# Patient Record
Sex: FEMALE | Race: WHITE | NOT HISPANIC OR LATINO | Employment: FULL TIME | URBAN - METROPOLITAN AREA
[De-identification: names, ages, dates, MRNs, and addresses within clinical notes are randomized per-mention and may not be internally consistent; named-entity substitution may affect disease eponyms.]

---

## 2018-02-20 ENCOUNTER — APPOINTMENT (OUTPATIENT)
Dept: PREADMISSION TESTING | Facility: HOSPITAL | Age: 63
End: 2018-02-20

## 2018-02-20 VITALS
OXYGEN SATURATION: 97 % | HEART RATE: 103 BPM | TEMPERATURE: 98.3 F | HEIGHT: 65 IN | DIASTOLIC BLOOD PRESSURE: 81 MMHG | BODY MASS INDEX: 40.65 KG/M2 | RESPIRATION RATE: 18 BRPM | SYSTOLIC BLOOD PRESSURE: 132 MMHG | WEIGHT: 244 LBS

## 2018-02-20 LAB
ANION GAP SERPL CALCULATED.3IONS-SCNC: 9.3 MMOL/L
BASOPHILS # BLD AUTO: 0.03 10*3/MM3 (ref 0–0.2)
BASOPHILS NFR BLD AUTO: 0.3 % (ref 0–1.5)
BUN BLD-MCNC: 18 MG/DL (ref 8–23)
BUN/CREAT SERPL: 15.1 (ref 7–25)
CALCIUM SPEC-SCNC: 9.3 MG/DL (ref 8.6–10.5)
CHLORIDE SERPL-SCNC: 94 MMOL/L (ref 98–107)
CO2 SERPL-SCNC: 33.7 MMOL/L (ref 22–29)
CREAT BLD-MCNC: 1.19 MG/DL (ref 0.57–1)
DEPRECATED RDW RBC AUTO: 44.1 FL (ref 37–54)
EOSINOPHIL # BLD AUTO: 0.05 10*3/MM3 (ref 0–0.7)
EOSINOPHIL NFR BLD AUTO: 0.5 % (ref 0.3–6.2)
ERYTHROCYTE [DISTWIDTH] IN BLOOD BY AUTOMATED COUNT: 13.9 % (ref 11.7–13)
GFR SERPL CREATININE-BSD FRML MDRD: 46 ML/MIN/1.73
GLUCOSE BLD-MCNC: 187 MG/DL (ref 65–99)
HCT VFR BLD AUTO: 40.7 % (ref 35.6–45.5)
HGB BLD-MCNC: 13.2 G/DL (ref 11.9–15.5)
IMM GRANULOCYTES # BLD: 0.03 10*3/MM3 (ref 0–0.03)
IMM GRANULOCYTES NFR BLD: 0.3 % (ref 0–0.5)
LYMPHOCYTES # BLD AUTO: 2.4 10*3/MM3 (ref 0.9–4.8)
LYMPHOCYTES NFR BLD AUTO: 23.1 % (ref 19.6–45.3)
MCH RBC QN AUTO: 28.3 PG (ref 26.9–32)
MCHC RBC AUTO-ENTMCNC: 32.4 G/DL (ref 32.4–36.3)
MCV RBC AUTO: 87.3 FL (ref 80.5–98.2)
MONOCYTES # BLD AUTO: 0.64 10*3/MM3 (ref 0.2–1.2)
MONOCYTES NFR BLD AUTO: 6.2 % (ref 5–12)
NEUTROPHILS # BLD AUTO: 7.22 10*3/MM3 (ref 1.9–8.1)
NEUTROPHILS NFR BLD AUTO: 69.6 % (ref 42.7–76)
PLATELET # BLD AUTO: 410 10*3/MM3 (ref 140–500)
PMV BLD AUTO: 10.7 FL (ref 6–12)
POTASSIUM BLD-SCNC: 3.5 MMOL/L (ref 3.5–5.2)
RBC # BLD AUTO: 4.66 10*6/MM3 (ref 3.9–5.2)
SODIUM BLD-SCNC: 137 MMOL/L (ref 136–145)
WBC NRBC COR # BLD: 10.37 10*3/MM3 (ref 4.5–10.7)

## 2018-02-20 PROCEDURE — 93010 ELECTROCARDIOGRAM REPORT: CPT | Performed by: INTERNAL MEDICINE

## 2018-02-20 PROCEDURE — 85025 COMPLETE CBC W/AUTO DIFF WBC: CPT | Performed by: OBSTETRICS & GYNECOLOGY

## 2018-02-20 PROCEDURE — 80048 BASIC METABOLIC PNL TOTAL CA: CPT | Performed by: OBSTETRICS & GYNECOLOGY

## 2018-02-20 PROCEDURE — 36415 COLL VENOUS BLD VENIPUNCTURE: CPT

## 2018-02-20 PROCEDURE — 93005 ELECTROCARDIOGRAM TRACING: CPT

## 2018-02-20 RX ORDER — HYDROCHLOROTHIAZIDE 50 MG/1
50 TABLET ORAL DAILY
COMMUNITY
End: 2021-03-03 | Stop reason: ALTCHOICE

## 2018-02-20 RX ORDER — OMEPRAZOLE 20 MG/1
20 CAPSULE, DELAYED RELEASE ORAL DAILY
COMMUNITY

## 2018-02-20 RX ORDER — ALBUTEROL SULFATE 90 UG/1
2 AEROSOL, METERED RESPIRATORY (INHALATION) EVERY 4 HOURS PRN
COMMUNITY
End: 2021-09-30

## 2018-02-20 RX ORDER — GUAIFENESIN 600 MG/1
600 TABLET, EXTENDED RELEASE ORAL 2 TIMES DAILY
COMMUNITY
End: 2018-05-15

## 2018-02-20 NOTE — DISCHARGE INSTRUCTIONS
Take the following medications the morning of surgery with a small sip of water:    DULERA, ALBUTEROL, OMEPRAZOLE    General Instructions:  • Do not eat solid food after midnight the night before surgery.  • You may drink clear liquids day of surgery but must stop at least one hour before your hospital arrival time.  • It is beneficial for you to have a clear drink that contains carbohydrates the day of surgery.  We suggest a 12 to 20 ounce bottle of Gatorade or Powerade for non-diabetic patients or a 12 to 20 ounce bottle of G2 or Powerade Zero for diabetic patients. (Pediatric patients, are not advised to drink a 12 to 20 ounce carbohydrate drink)    Clear liquids are liquids you can see through.  Nothing red in color.     Plain water                               Sports drinks  Sodas                                   Gelatin (Jell-O)  Fruit juices without pulp such as white grape juice and apple juice  Popsicles that contain no fruit or yogurt  Tea or coffee (no cream or milk added)  Gatorade / Powerade  G2 / Powerade Zero    • Infants may have breast milk up to four hours before surgery.  • Infants drinking formula may drink formula up to six hours before surgery.   • Patients who avoid smoking, chewing tobacco and alcohol for 4 weeks prior to surgery have a reduced risk of post-operative complications.  Quit smoking as many days before surgery as you can.  • Do not smoke, use chewing tobacco or drink alcohol the day of surgery.   • If applicable bring your C-PAP/ BI-PAP machine.  • Bring any papers given to you in the doctor’s office.  • Wear clean comfortable clothes and socks.  • Do not wear contact lenses or make-up.  Bring a case for your glasses.   • Bring crutches or walker if applicable.  • Remove all piercings.  Leave jewelry and any other valuables at home.  • Hair extensions with metal clips must be removed prior to surgery.  • The Pre-Admission Testing nurse will instruct you to bring medications if  unable to obtain an accurate list in Pre-Admission Testing.        If you were given a blood bank ID arm band remember to bring it with you the day of surgery.    Preventing a Surgical Site Infection:  • For 2 to 3 days before surgery, avoid shaving with a razor because the razor can irritate skin and make it easier to develop an infection.  • The night prior to surgery sleep in a clean bed with clean clothing.  Do not allow pets to sleep with you.  • Shower on the morning of surgery using a fresh bar of anti-bacterial soap (such as Dial) and clean washcloth.  Dry with a clean towel and dress in clean clothing.  • Ask your surgeon if you will be receiving antibiotics prior to surgery.  • Make sure you, your family, and all healthcare providers clean their hands with soap and water or an alcohol based hand  before caring for you or your wound.    Day of surgery:  Upon arrival, a Pre-op nurse and Anesthesiologist will review your health history, obtain vital signs, and answer questions you may have.  The only belongings needed at this time will be your home medications and if applicable your C-PAP/BI-PAP machine.  If you are staying overnight your family can leave the rest of your belongings in the car and bring them to your room later.  A Pre-op nurse will start an IV and you may receive medication in preparation for surgery, including something to help you relax.  Your family will be able to see you in the Pre-op area.  While you are in surgery your family should notify the waiting room  if they leave the waiting room area and provide a contact phone number.    Please be aware that surgery does come with discomfort.  We want to make every effort to control your discomfort so please discuss any uncontrolled symptoms with your nurse.   Your doctor will most likely have prescribed pain medications.      If you are going home after surgery you will receive individualized written care instructions  before being discharged.  A responsible adult must drive you to and from the hospital on the day of your surgery and stay with you for 24 hours.    If you are staying overnight following surgery, you will be transported to your hospital room following the recovery period.  [facility] has all private rooms.    If you have any questions please call Pre-Admission Testing at [phone].  Deductibles and co-payments are collected on the day of service. Please be prepared to pay the required co-pay, deductible or deposit on the day of service as defined by your plan.

## 2018-02-21 ENCOUNTER — HOSPITAL ENCOUNTER (OUTPATIENT)
Facility: HOSPITAL | Age: 63
Setting detail: HOSPITAL OUTPATIENT SURGERY
Discharge: HOME | End: 2018-02-21
Attending: OBSTETRICS & GYNECOLOGY | Admitting: OBSTETRICS & GYNECOLOGY

## 2018-02-21 ENCOUNTER — ANESTHESIA EVENT (OUTPATIENT)
Dept: PERIOP | Facility: HOSPITAL | Age: 63
End: 2018-02-21

## 2018-02-21 ENCOUNTER — ANESTHESIA (OUTPATIENT)
Dept: PERIOP | Facility: HOSPITAL | Age: 63
End: 2018-02-21

## 2018-02-21 VITALS
HEART RATE: 60 BPM | RESPIRATION RATE: 16 BRPM | OXYGEN SATURATION: 96 % | SYSTOLIC BLOOD PRESSURE: 136 MMHG | TEMPERATURE: 97.6 F | DIASTOLIC BLOOD PRESSURE: 81 MMHG

## 2018-02-21 DIAGNOSIS — N95.0 POSTMENOPAUSAL BLEEDING: ICD-10-CM

## 2018-02-21 PROCEDURE — 88305 TISSUE EXAM BY PATHOLOGIST: CPT | Performed by: OBSTETRICS & GYNECOLOGY

## 2018-02-21 PROCEDURE — 25010000002 PROPOFOL 10 MG/ML EMULSION: Performed by: NURSE ANESTHETIST, CERTIFIED REGISTERED

## 2018-02-21 PROCEDURE — 25010000002 FENTANYL CITRATE (PF) 100 MCG/2ML SOLUTION: Performed by: NURSE ANESTHETIST, CERTIFIED REGISTERED

## 2018-02-21 PROCEDURE — 25010000002 MIDAZOLAM PER 1 MG: Performed by: ANESTHESIOLOGY

## 2018-02-21 PROCEDURE — 25010000002 MIDAZOLAM PER 1 MG: Performed by: NURSE ANESTHETIST, CERTIFIED REGISTERED

## 2018-02-21 PROCEDURE — 25010000002 ONDANSETRON PER 1 MG: Performed by: NURSE ANESTHETIST, CERTIFIED REGISTERED

## 2018-02-21 RX ORDER — HYDRALAZINE HYDROCHLORIDE 20 MG/ML
5 INJECTION INTRAMUSCULAR; INTRAVENOUS
Status: DISCONTINUED | OUTPATIENT
Start: 2018-02-21 | End: 2018-02-21 | Stop reason: HOSPADM

## 2018-02-21 RX ORDER — MIDAZOLAM HYDROCHLORIDE 1 MG/ML
INJECTION INTRAMUSCULAR; INTRAVENOUS AS NEEDED
Status: DISCONTINUED | OUTPATIENT
Start: 2018-02-21 | End: 2018-02-21 | Stop reason: SURG

## 2018-02-21 RX ORDER — LIDOCAINE HYDROCHLORIDE 10 MG/ML
0.5 INJECTION, SOLUTION EPIDURAL; INFILTRATION; INTRACAUDAL; PERINEURAL ONCE AS NEEDED
Status: DISCONTINUED | OUTPATIENT
Start: 2018-02-21 | End: 2018-02-21

## 2018-02-21 RX ORDER — OXYCODONE HYDROCHLORIDE AND ACETAMINOPHEN 5; 325 MG/1; MG/1
1 TABLET ORAL ONCE AS NEEDED
Status: DISCONTINUED | OUTPATIENT
Start: 2018-02-21 | End: 2018-02-21 | Stop reason: HOSPADM

## 2018-02-21 RX ORDER — ACETAMINOPHEN 650 MG/1
650 SUPPOSITORY RECTAL ONCE AS NEEDED
Status: DISCONTINUED | OUTPATIENT
Start: 2018-02-21 | End: 2018-02-21 | Stop reason: HOSPADM

## 2018-02-21 RX ORDER — NALBUPHINE HCL 10 MG/ML
10 AMPUL (ML) INJECTION EVERY 4 HOURS PRN
Status: DISCONTINUED | OUTPATIENT
Start: 2018-02-21 | End: 2018-02-21 | Stop reason: HOSPADM

## 2018-02-21 RX ORDER — OXYCODONE HYDROCHLORIDE AND ACETAMINOPHEN 5; 325 MG/1; MG/1
1 TABLET ORAL EVERY 4 HOURS PRN
Qty: 10 TABLET | Refills: 0 | Status: SHIPPED | OUTPATIENT
Start: 2018-02-21 | End: 2018-05-15

## 2018-02-21 RX ORDER — IBUPROFEN 600 MG/1
600 TABLET ORAL EVERY 6 HOURS PRN
Qty: 30 TABLET | Refills: 0 | Status: SHIPPED | OUTPATIENT
Start: 2018-02-21 | End: 2018-05-15

## 2018-02-21 RX ORDER — MIDAZOLAM HYDROCHLORIDE 1 MG/ML
1 INJECTION INTRAMUSCULAR; INTRAVENOUS
Status: DISCONTINUED | OUTPATIENT
Start: 2018-02-21 | End: 2018-02-21

## 2018-02-21 RX ORDER — ACETAMINOPHEN 325 MG/1
650 TABLET ORAL ONCE
Status: DISCONTINUED | OUTPATIENT
Start: 2018-02-21 | End: 2018-02-21

## 2018-02-21 RX ORDER — FENTANYL CITRATE 50 UG/ML
INJECTION, SOLUTION INTRAMUSCULAR; INTRAVENOUS AS NEEDED
Status: DISCONTINUED | OUTPATIENT
Start: 2018-02-21 | End: 2018-02-21 | Stop reason: SURG

## 2018-02-21 RX ORDER — FAMOTIDINE 10 MG/ML
20 INJECTION, SOLUTION INTRAVENOUS ONCE
Status: COMPLETED | OUTPATIENT
Start: 2018-02-21 | End: 2018-02-21

## 2018-02-21 RX ORDER — SODIUM CHLORIDE, SODIUM LACTATE, POTASSIUM CHLORIDE, CALCIUM CHLORIDE 600; 310; 30; 20 MG/100ML; MG/100ML; MG/100ML; MG/100ML
9 INJECTION, SOLUTION INTRAVENOUS CONTINUOUS
Status: DISCONTINUED | OUTPATIENT
Start: 2018-02-21 | End: 2018-02-21

## 2018-02-21 RX ORDER — LIDOCAINE HYDROCHLORIDE 10 MG/ML
0.5 INJECTION, SOLUTION EPIDURAL; INFILTRATION; INTRACAUDAL; PERINEURAL ONCE AS NEEDED
Status: COMPLETED | OUTPATIENT
Start: 2018-02-21 | End: 2018-02-21

## 2018-02-21 RX ORDER — PROPOFOL 10 MG/ML
VIAL (ML) INTRAVENOUS CONTINUOUS PRN
Status: DISCONTINUED | OUTPATIENT
Start: 2018-02-21 | End: 2018-02-21 | Stop reason: SURG

## 2018-02-21 RX ORDER — NALOXONE HCL 0.4 MG/ML
0.4 VIAL (ML) INJECTION AS NEEDED
Status: DISCONTINUED | OUTPATIENT
Start: 2018-02-21 | End: 2018-02-21 | Stop reason: HOSPADM

## 2018-02-21 RX ORDER — MIDAZOLAM HYDROCHLORIDE 1 MG/ML
2 INJECTION INTRAMUSCULAR; INTRAVENOUS
Status: DISCONTINUED | OUTPATIENT
Start: 2018-02-21 | End: 2018-02-21 | Stop reason: HOSPADM

## 2018-02-21 RX ORDER — DIPHENHYDRAMINE HYDROCHLORIDE 50 MG/ML
12.5 INJECTION INTRAMUSCULAR; INTRAVENOUS
Status: DISCONTINUED | OUTPATIENT
Start: 2018-02-21 | End: 2018-02-21 | Stop reason: HOSPADM

## 2018-02-21 RX ORDER — PROMETHAZINE HYDROCHLORIDE 25 MG/ML
6.25 INJECTION, SOLUTION INTRAMUSCULAR; INTRAVENOUS ONCE AS NEEDED
Status: DISCONTINUED | OUTPATIENT
Start: 2018-02-21 | End: 2018-02-21 | Stop reason: HOSPADM

## 2018-02-21 RX ORDER — MAGNESIUM HYDROXIDE 1200 MG/15ML
LIQUID ORAL AS NEEDED
Status: DISCONTINUED | OUTPATIENT
Start: 2018-02-21 | End: 2018-02-21 | Stop reason: HOSPADM

## 2018-02-21 RX ORDER — ONDANSETRON 2 MG/ML
INJECTION INTRAMUSCULAR; INTRAVENOUS AS NEEDED
Status: DISCONTINUED | OUTPATIENT
Start: 2018-02-21 | End: 2018-02-21 | Stop reason: SURG

## 2018-02-21 RX ORDER — ACETAMINOPHEN 325 MG/1
650 TABLET ORAL ONCE AS NEEDED
Status: DISCONTINUED | OUTPATIENT
Start: 2018-02-21 | End: 2018-02-21 | Stop reason: HOSPADM

## 2018-02-21 RX ORDER — PROMETHAZINE HYDROCHLORIDE 25 MG/1
25 SUPPOSITORY RECTAL ONCE AS NEEDED
Status: DISCONTINUED | OUTPATIENT
Start: 2018-02-21 | End: 2018-02-21 | Stop reason: HOSPADM

## 2018-02-21 RX ORDER — ACETAMINOPHEN 325 MG/1
650 TABLET ORAL ONCE
Status: COMPLETED | OUTPATIENT
Start: 2018-02-21 | End: 2018-02-21

## 2018-02-21 RX ORDER — MIDAZOLAM HYDROCHLORIDE 1 MG/ML
2 INJECTION INTRAMUSCULAR; INTRAVENOUS
Status: DISCONTINUED | OUTPATIENT
Start: 2018-02-21 | End: 2018-02-21

## 2018-02-21 RX ORDER — MIDAZOLAM HYDROCHLORIDE 1 MG/ML
1 INJECTION INTRAMUSCULAR; INTRAVENOUS
Status: DISCONTINUED | OUTPATIENT
Start: 2018-02-21 | End: 2018-02-21 | Stop reason: HOSPADM

## 2018-02-21 RX ORDER — FENTANYL CITRATE 50 UG/ML
50 INJECTION, SOLUTION INTRAMUSCULAR; INTRAVENOUS
Status: DISCONTINUED | OUTPATIENT
Start: 2018-02-21 | End: 2018-02-21 | Stop reason: HOSPADM

## 2018-02-21 RX ORDER — NALBUPHINE HCL 10 MG/ML
2 AMPUL (ML) INJECTION EVERY 4 HOURS PRN
Status: DISCONTINUED | OUTPATIENT
Start: 2018-02-21 | End: 2018-02-21 | Stop reason: HOSPADM

## 2018-02-21 RX ORDER — SODIUM CHLORIDE, SODIUM LACTATE, POTASSIUM CHLORIDE, CALCIUM CHLORIDE 600; 310; 30; 20 MG/100ML; MG/100ML; MG/100ML; MG/100ML
9 INJECTION, SOLUTION INTRAVENOUS CONTINUOUS
Status: DISCONTINUED | OUTPATIENT
Start: 2018-02-21 | End: 2018-02-21 | Stop reason: HOSPADM

## 2018-02-21 RX ORDER — PROMETHAZINE HYDROCHLORIDE 25 MG/1
25 TABLET ORAL ONCE AS NEEDED
Status: DISCONTINUED | OUTPATIENT
Start: 2018-02-21 | End: 2018-02-21 | Stop reason: HOSPADM

## 2018-02-21 RX ORDER — SODIUM CHLORIDE 0.9 % (FLUSH) 0.9 %
1-10 SYRINGE (ML) INJECTION AS NEEDED
Status: DISCONTINUED | OUTPATIENT
Start: 2018-02-21 | End: 2018-02-21

## 2018-02-21 RX ORDER — SODIUM CHLORIDE 0.9 % (FLUSH) 0.9 %
1-10 SYRINGE (ML) INJECTION AS NEEDED
Status: DISCONTINUED | OUTPATIENT
Start: 2018-02-21 | End: 2018-02-21 | Stop reason: HOSPADM

## 2018-02-21 RX ORDER — LIDOCAINE HYDROCHLORIDE 10 MG/ML
INJECTION, SOLUTION INFILTRATION; PERINEURAL AS NEEDED
Status: DISCONTINUED | OUTPATIENT
Start: 2018-02-21 | End: 2018-02-21 | Stop reason: HOSPADM

## 2018-02-21 RX ADMIN — MIDAZOLAM 2 MG: 1 INJECTION INTRAMUSCULAR; INTRAVENOUS at 12:09

## 2018-02-21 RX ADMIN — LIDOCAINE HYDROCHLORIDE 0.5 ML: 10 INJECTION, SOLUTION EPIDURAL; INFILTRATION; INTRACAUDAL; PERINEURAL at 10:40

## 2018-02-21 RX ADMIN — ACETAMINOPHEN 650 MG: 325 TABLET, FILM COATED ORAL at 10:43

## 2018-02-21 RX ADMIN — FENTANYL CITRATE 50 MCG: 50 INJECTION INTRAMUSCULAR; INTRAVENOUS at 12:27

## 2018-02-21 RX ADMIN — ONDANSETRON 4 MG: 2 INJECTION INTRAMUSCULAR; INTRAVENOUS at 12:27

## 2018-02-21 RX ADMIN — FAMOTIDINE 20 MG: 10 INJECTION, SOLUTION INTRAVENOUS at 10:44

## 2018-02-21 RX ADMIN — FENTANYL CITRATE 50 MCG: 50 INJECTION INTRAMUSCULAR; INTRAVENOUS at 12:09

## 2018-02-21 RX ADMIN — SODIUM CHLORIDE, POTASSIUM CHLORIDE, SODIUM LACTATE AND CALCIUM CHLORIDE 9 ML/HR: 600; 310; 30; 20 INJECTION, SOLUTION INTRAVENOUS at 10:40

## 2018-02-21 RX ADMIN — PROPOFOL 100 MCG/KG/MIN: 10 INJECTION, EMULSION INTRAVENOUS at 12:09

## 2018-02-21 RX ADMIN — MIDAZOLAM 2 MG: 1 INJECTION INTRAMUSCULAR; INTRAVENOUS at 10:44

## 2018-02-21 NOTE — OP NOTE
Patient Name: Virginia Wood  :  1955  MRN:  [id number]      Date of Service:  18      Surgeon: Aleja Maciel MD       Pre-operative diagnosis(es): Postmenopausal bleeding     Post-operative diagnosis(es): Postmenopausal bleeding   Procedure(s): Procedure(s):  DILATATION AND CURETTAGE HYSTEROSCOPY          Anesthesia: Type: MAC          Operative findings: Hysteroscopic survey revealed a normal-appearing atrophic endometrium with no masses appreciated     Specimens removed: Endometrial currettings           EBL: 10cc     Indication for surgery:  Postmenopausal bleeding, thickened endometrium    Procedure:   Patient was taken operating room where IV sedation was obtained without difficulty.  She was then placed in the dorsal lithotomy position in stirrups and prepped and draped in the normal sterile fashion.  A speculum was placed within the vagina and the cervix was grasped with a tenaculum.  A paracervical block was then performed injecting 1% lidocaine at the 4 and 7:00 positions of the cervical vaginal junction.  10 cc was injected at each site.   The cervix was then gently dilated in order to allow passage of the operative hysteroscope and the above findings were noted.   The hysteroscope was removed and several passages with the curette were performed until minimal tissue was noted and a gritty texture was noted within the cavity.  The  Tenaculum and speculum was then removed.  Sponge and needle counts are correct ×2.  She was awakened from anesthesia and taken to face to recovery in stable condition.                                              Aleja Maciel MD  18  1:32 PM

## 2018-02-21 NOTE — ANESTHESIA POSTPROCEDURE EVALUATION
Patient: Virginia Wood    Procedure Summary     Date Anesthesia Start Anesthesia Stop Room / Location    02/21/18 1209 1242  [facility]  /  [facility]       Procedure Diagnosis Surgeon Provider    DILATATION AND CURETTAGE HYSTEROSCOPY WITH POLYPECTOMY AND MYOSURE (N/A Uterus) No diagnosis on file. MD Ambrose Bragg MD          Anesthesia Type: MAC  Last vitals  BP   136/81 (02/21/18 1315)   Temp   36.4 °C (97.6 °F) (02/21/18 1242)   Pulse   60 (02/21/18 1315)   Resp   16 (02/21/18 1315)     SpO2   96 % (02/21/18 1315)     Post Anesthesia Care and Evaluation    Patient location during evaluation: bedside  Patient participation: complete - patient participated  Level of consciousness: awake and alert  Pain management: adequate  Airway patency: patent  Anesthetic complications: No anesthetic complications    Cardiovascular status: acceptable  Respiratory status: acceptable  Hydration status: acceptable    Comments: /81 (BP Location: Right arm, Patient Position: Lying)  Pulse 60  Temp 36.4 °C (97.6 °F) (Oral)   Resp 16  SpO2 96%

## 2018-02-21 NOTE — H&P
Patient Care Team:  No Known Provider as PCP - General    Chief complaint post-menopausal bleeding    Subjective     History of Present Illness Post-menopausal woman with vaginal bleeding and ultrasound showing thickened EMS. EMB shows uterine polyp.    Review of Systems     Past Medical History:   Diagnosis Date   • Asthma    • Congenital abnormality of kidney    • GERD (gastroesophageal reflux disease)    • Sinusitis    • Uterine polyp    • UTI (urinary tract infection)     on augmentin     Past Surgical History:   Procedure Laterality Date   • APPENDECTOMY     • BLADDER SURGERY     • DILATATION AND CURETTAGE     • LUMBAR DISCECTOMY     • NEPHRECTOMY PARTIAL Left    • TUBAL ABDOMINAL LIGATION       Family History   Problem Relation Age of Onset   • Malig Hyperthermia Neg Hx      Social History   Substance Use Topics   • Smoking status: Never Smoker   • Smokeless tobacco: Never Used   • Alcohol use Yes      Comment: OCC     Prescriptions Prior to Admission   Medication Sig Dispense Refill Last Dose   • albuterol (PROVENTIL HFA;VENTOLIN HFA) 108 (90 Base) MCG/ACT inhaler Inhale 2 puffs Every 4 (Four) Hours As Needed for Wheezing.   More than a month at Unknown time   • Amoxicillin-Pot Clavulanate (AUGMENTIN PO) Take  by mouth 2 (Two) Times a Day.   2/20/2018   • guaiFENesin (MUCINEX) 600 MG 12 hr tablet Take 600 mg by mouth 2 (Two) Times a Day.   2/20/2018   • hydrochlorothiazide (HYDRODIURIL) 50 MG tablet Take 50 mg by mouth Daily.   2/20/2018   • mometasone-formoterol (DULERA 100) 100-5 MCG/ACT inhaler Inhale 2 puffs 2 (Two) Times a Day.   More than a month at Unknown time   • omeprazole (priLOSEC) 20 MG capsule Take 20 mg by mouth Daily.   2/21/2018     Allergies:  Nickel    Objective      Vital Signs  Temp:  [97.9 °F (36.6 °C)-98.3 °F (36.8 °C)] 97.9 °F (36.6 °C)  Heart Rate:  [] 78  Resp:  [16-18] 16  BP: (132-144)/(81-85) 144/85    Physical Exam  See clinic note  Results Review:   I reviewed the  patient's new clinical results.      Assessment/Plan   Plan for hysteroscopy/D&C/polypectomy. All questions answered, risks reviewed, consent signed.  Active Problems:    * No active hospital problems. *      Assessment & Plan    I discussed the patients findings and my recommendations with patient    Aleja Maciel MD  02/21/18  10:15 AM

## 2018-02-21 NOTE — PLAN OF CARE
Problem: Perioperative Period (Adult)  Goal: Signs and Symptoms of Listed Potential Problems Will be Absent or Manageable (Perioperative Period)  Outcome: Ongoing (interventions implemented as appropriate)   02/21/18 0958   Perioperative Period   Problems Assessed (Perioperative Period) all

## 2018-02-21 NOTE — ANESTHESIA PREPROCEDURE EVALUATION
Anesthesia Evaluation     no history of anesthetic complications:  NPO Solid Status: > 8 hours             Airway   Mallampati: I  TM distance: >3 FB  Neck ROM: full  no difficulty expected  Dental - normal exam     Pulmonary - normal exam   (+) asthma,   (-) not a smoker  Cardiovascular   Exercise tolerance: good (4-7 METS)    Rhythm: regular    (-) murmur, carotid bruits      Neuro/Psych  GI/Hepatic/Renal/Endo    (+) obesity,  GERD,     Musculoskeletal     Abdominal    Substance History      OB/GYN          Other                      Anesthesia Plan    ASA 3     MAC   (D/W pt. MAC and possible awareness intra op.  Pt understands MAC and GA are not the same and the possibility of GA being required for failed MAC  \    Listed as GA on schedule but will plan MAC.  Discussed both w pt.)  intravenous induction

## 2018-02-21 NOTE — PLAN OF CARE
Problem: Patient Care Overview (Adult)  Goal: Plan of Care Review  Outcome: Ongoing (interventions implemented as appropriate)   02/21/18 0959   Coping/Psychosocial Response Interventions   Plan Of Care Reviewed With patient     Goal: Discharge Needs Assessment  Outcome: Ongoing (interventions implemented as appropriate)   02/21/18 0959   Discharge Needs Assessment   Concerns To Be Addressed denies needs/concerns at this time

## 2018-02-22 LAB
CYTO UR: NORMAL
LAB AP CASE REPORT: NORMAL
Lab: NORMAL
PATH REPORT.FINAL DX SPEC: NORMAL
PATH REPORT.GROSS SPEC: NORMAL

## 2018-05-15 ENCOUNTER — APPOINTMENT (OUTPATIENT)
Dept: GENERAL RADIOLOGY | Facility: HOSPITAL | Age: 63
End: 2018-05-15

## 2018-05-15 ENCOUNTER — HOSPITAL ENCOUNTER (EMERGENCY)
Facility: HOSPITAL | Age: 63
Discharge: HOME | End: 2018-05-15
Attending: EMERGENCY MEDICINE | Admitting: EMERGENCY MEDICINE

## 2018-05-15 VITALS
WEIGHT: 235 LBS | TEMPERATURE: 97.9 F | DIASTOLIC BLOOD PRESSURE: 70 MMHG | SYSTOLIC BLOOD PRESSURE: 133 MMHG | RESPIRATION RATE: 15 BRPM | BODY MASS INDEX: 39.15 KG/M2 | OXYGEN SATURATION: 97 % | HEART RATE: 69 BPM | HEIGHT: 65 IN

## 2018-05-15 DIAGNOSIS — E87.6 HYPOKALEMIA: ICD-10-CM

## 2018-05-15 DIAGNOSIS — R07.89 ATYPICAL CHEST PAIN: Primary | ICD-10-CM

## 2018-05-15 LAB
ALBUMIN SERPL-MCNC: 3.6 G/DL (ref 3.5–5.2)
ALBUMIN/GLOB SERPL: 1.2 G/DL
ALP SERPL-CCNC: 84 U/L (ref 40–129)
ALT SERPL W P-5'-P-CCNC: 16 U/L (ref 5–33)
ANION GAP SERPL CALCULATED.3IONS-SCNC: 10.8 MMOL/L
APTT PPP: 30.3 SECONDS (ref 24.3–38.1)
AST SERPL-CCNC: 15 U/L (ref 5–32)
BASOPHILS # BLD AUTO: 0.05 10*3/MM3 (ref 0–0.2)
BASOPHILS NFR BLD AUTO: 0.7 % (ref 0–2)
BILIRUB SERPL-MCNC: 0.3 MG/DL (ref 0.2–1.2)
BUN BLD-MCNC: 16 MG/DL (ref 8–23)
BUN/CREAT SERPL: 16 (ref 7–25)
CALCIUM SPEC-SCNC: 9.3 MG/DL (ref 8.8–10.5)
CHLORIDE SERPL-SCNC: 99 MMOL/L (ref 98–107)
CO2 SERPL-SCNC: 30.2 MMOL/L (ref 22–29)
CREAT BLD-MCNC: 1 MG/DL (ref 0.57–1)
D DIMER PPP FEU-MCNC: 0.33 MCGFEU/ML (ref 0–0.46)
DEPRECATED RDW RBC AUTO: 40.3 FL (ref 37–54)
EOSINOPHIL # BLD AUTO: 0.09 10*3/MM3 (ref 0.1–0.3)
EOSINOPHIL NFR BLD AUTO: 1.2 % (ref 0–4)
ERYTHROCYTE [DISTWIDTH] IN BLOOD BY AUTOMATED COUNT: 13.1 % (ref 11.5–14.5)
GFR SERPL CREATININE-BSD FRML MDRD: 56 ML/MIN/1.73
GLOBULIN UR ELPH-MCNC: 2.9 GM/DL
GLUCOSE BLD-MCNC: 119 MG/DL (ref 65–99)
HCT VFR BLD AUTO: 38.2 % (ref 37–47)
HGB BLD-MCNC: 12.6 G/DL (ref 12–16)
IMM GRANULOCYTES # BLD: 0.01 10*3/MM3 (ref 0–0.03)
IMM GRANULOCYTES NFR BLD: 0.1 % (ref 0–0.5)
INR PPP: 1.09 (ref 0.9–1.1)
LYMPHOCYTES # BLD AUTO: 1.65 10*3/MM3 (ref 0.6–4.8)
LYMPHOCYTES NFR BLD AUTO: 21.6 % (ref 20–45)
MCH RBC QN AUTO: 28 PG (ref 27–31)
MCHC RBC AUTO-ENTMCNC: 33 G/DL (ref 31–37)
MCV RBC AUTO: 84.9 FL (ref 81–99)
MONOCYTES # BLD AUTO: 0.8 10*3/MM3 (ref 0–1)
MONOCYTES NFR BLD AUTO: 10.5 % (ref 3–8)
NEUTROPHILS # BLD AUTO: 5.03 10*3/MM3 (ref 1.5–8.3)
NEUTROPHILS NFR BLD AUTO: 65.9 % (ref 45–70)
NRBC BLD MANUAL-RTO: 0 /100 WBC (ref 0–0)
PLATELET # BLD AUTO: 323 10*3/MM3 (ref 140–500)
PMV BLD AUTO: 10 FL (ref 7.4–10.4)
POTASSIUM BLD-SCNC: 2.8 MMOL/L (ref 3.5–5.2)
PROT SERPL-MCNC: 6.5 G/DL (ref 6–8.5)
PROTHROMBIN TIME: 14.1 SECONDS (ref 12.1–15)
RBC # BLD AUTO: 4.5 10*6/MM3 (ref 4.2–5.4)
SODIUM BLD-SCNC: 140 MMOL/L (ref 136–145)
TROPONIN T SERPL-MCNC: <0.01 NG/ML (ref 0–0.03)
WBC NRBC COR # BLD: 7.63 10*3/MM3 (ref 4.8–10.8)

## 2018-05-15 PROCEDURE — 71045 X-RAY EXAM CHEST 1 VIEW: CPT

## 2018-05-15 PROCEDURE — 93010 ELECTROCARDIOGRAM REPORT: CPT | Performed by: INTERNAL MEDICINE

## 2018-05-15 PROCEDURE — 93005 ELECTROCARDIOGRAM TRACING: CPT

## 2018-05-15 PROCEDURE — 85379 FIBRIN DEGRADATION QUANT: CPT | Performed by: PHYSICIAN ASSISTANT

## 2018-05-15 PROCEDURE — 84484 ASSAY OF TROPONIN QUANT: CPT | Performed by: PHYSICIAN ASSISTANT

## 2018-05-15 PROCEDURE — 85730 THROMBOPLASTIN TIME PARTIAL: CPT | Performed by: PHYSICIAN ASSISTANT

## 2018-05-15 PROCEDURE — 99284 EMERGENCY DEPT VISIT MOD MDM: CPT

## 2018-05-15 PROCEDURE — 85610 PROTHROMBIN TIME: CPT | Performed by: PHYSICIAN ASSISTANT

## 2018-05-15 PROCEDURE — 80053 COMPREHEN METABOLIC PANEL: CPT | Performed by: PHYSICIAN ASSISTANT

## 2018-05-15 PROCEDURE — 99285 EMERGENCY DEPT VISIT HI MDM: CPT | Performed by: PHYSICIAN ASSISTANT

## 2018-05-15 PROCEDURE — 93005 ELECTROCARDIOGRAM TRACING: CPT | Performed by: EMERGENCY MEDICINE

## 2018-05-15 PROCEDURE — 85025 COMPLETE CBC W/AUTO DIFF WBC: CPT | Performed by: PHYSICIAN ASSISTANT

## 2018-05-15 RX ORDER — POTASSIUM CHLORIDE 20 MEQ/1
60 TABLET, EXTENDED RELEASE ORAL DAILY
Status: DISCONTINUED | OUTPATIENT
Start: 2018-05-15 | End: 2018-05-15 | Stop reason: HOSPADM

## 2018-05-15 RX ORDER — METHOCARBAMOL 750 MG/1
750 TABLET, FILM COATED ORAL 3 TIMES DAILY PRN
Qty: 20 TABLET | Refills: 0 | Status: SHIPPED | OUTPATIENT
Start: 2018-05-15 | End: 2021-03-03 | Stop reason: ALTCHOICE

## 2018-05-15 RX ORDER — MELOXICAM 7.5 MG/1
7.5 TABLET ORAL DAILY PRN
Qty: 15 TABLET | Refills: 0 | Status: SHIPPED | OUTPATIENT
Start: 2018-05-15 | End: 2021-03-03 | Stop reason: ALTCHOICE

## 2018-05-15 RX ORDER — SODIUM CHLORIDE 0.9 % (FLUSH) 0.9 %
10 SYRINGE (ML) INJECTION AS NEEDED
Status: DISCONTINUED | OUTPATIENT
Start: 2018-05-15 | End: 2018-05-15 | Stop reason: HOSPADM

## 2018-05-15 RX ORDER — IBUPROFEN 400 MG/1
800 TABLET ORAL ONCE
Status: COMPLETED | OUTPATIENT
Start: 2018-05-15 | End: 2018-05-15

## 2018-05-15 RX ADMIN — Medication 10 ML: at 12:28

## 2018-05-15 RX ADMIN — IBUPROFEN 800 MG: 400 TABLET ORAL at 14:34

## 2018-05-15 RX ADMIN — POTASSIUM CHLORIDE 60 MEQ: 1500 TABLET, EXTENDED RELEASE ORAL at 14:34

## 2018-05-15 NOTE — ED PROVIDER NOTES
Subjective   History of Present Illness  History of Present Illness    Chief complaint: cp    Location: R upper chest with radiation to back    Quality/Severity:  Sharp, moderate when it occurs.  None currently.    Timing/Duration: 2 days, worsening    Modifying Factors: worse with deep breath and cough and palpation. Nothing makes better.    Associated Symptoms: Chronic cough is unchanged.  Denies hemoptysis.  Denies shortness of breath.  Eyes abdominal pain or nausea/vomiting.  Denies fevers or chills.    Narrative: 63-year-old female, who is a , presents with chest pain that started 2 days ago.  Within the past month she is also taken extended vacations with multiple flights.  She went to an immediate care center earlier today for her chest pain.  She was told to come here for possible pulmonary embolus.  She denies any trauma.  No history of cardiac.    Review of Systems  General: Denies fevers or chills.  Denies any weakness or fatigue.  Denies any weight loss or weight gain.  SKIN: Denies any rashes lesions or ulcers.  Denies color change.  ENT: Denies sore throat or rhinorrhea.  Denies ear pain.    EYES: Denies any blurred vision.  Denies any change in vision.  Denies any photophobia.  Denies any vision loss.  LUNGS: Denies any shortness of breath or wheezing.  Denies any cough.  Denies any hemoptysis.  CARDIAC: + chest pain.  Denies palpitations.  Denies syncope.  Denies any edema  ABD: Denies any abdominal pain.  Denies any nausea or vomiting or diarrhea.  Denies any rectal bleeding.  Denies constipation  : Denies any dysuria, urgency, frequency or hematuria.  Denies discharge.  Denies flank pain.  NEURO: Denies any focal weakness.  Denies headache.  Denies seizures.  Denies changes in speech or difficulty walking.  ENDOCRINE: Denies polydipsia and polyuria  M/S: Denies arthralgias, back pain, myalgias or neck pain  HEME/LYMPH: Negative for adenopathy. Does not bruise/bleed easily.   PSYCH:  Negative for suicidal ideas. Denies anxiety or depression  review was performed in addition to those in the above all other reviews are negative.      Past Medical History:   Diagnosis Date   • Asthma    • Congenital abnormality of kidney    • GERD (gastroesophageal reflux disease)    • Sinusitis    • Uterine polyp    • UTI (urinary tract infection)     on augmentin       Allergies   Allergen Reactions   • Nickel Rash       Past Surgical History:   Procedure Laterality Date   • APPENDECTOMY     • BLADDER SURGERY     • D&C HYSTEROSCOPY N/A 2/21/2018    Procedure: DILATATION AND CURETTAGE HYSTEROSCOPY WITH POLYPECTOMY AND MYOSURE;  Surgeon: Aleja Maciel MD;  [place] [facility] [place] [facility] [place]  Service:    • DILATATION AND CURETTAGE     • LUMBAR DISCECTOMY     • NEPHRECTOMY PARTIAL Left    • TUBAL ABDOMINAL LIGATION         Family History   Problem Relation Age of Onset   • Malig Hyperthermia Neg Hx        Social History     Social History   • Marital status:      Social History Main Topics   • Smoking status: Never Smoker   • Smokeless tobacco: Never Used   • Alcohol use Yes      Comment: OCC   • Drug use: No   • Sexual activity: Defer     Other Topics Concern   • Not on file     Current Facility-Administered Medications:   •  Insert peripheral IV, , , Once **AND** sodium chloride 0.9 % flush 10 mL, 10 mL, Intravenous, PRN, Kathryn Kaminski PA-C, 10 mL at 05/15/18 1228    Current Outpatient Prescriptions:   •  albuterol (PROVENTIL HFA;VENTOLIN HFA) 108 (90 Base) MCG/ACT inhaler, Inhale 2 puffs Every 4 (Four) Hours As Needed for Wheezing., Disp: , Rfl:   •  hydrochlorothiazide (HYDRODIURIL) 50 MG tablet, Take 50 mg by mouth Daily., Disp: , Rfl:   •  ibuprofen (ADVIL,MOTRIN) 600 MG tablet, Take 1 tablet by mouth Every 6 (Six) Hours As Needed for Mild Pain ., Disp: 30 tablet, Rfl: 0  •  omeprazole (priLOSEC) 20 MG capsule, Take 20 mg by mouth Daily., Disp: , Rfl:           Objective   Physical Exam  Vitals:    05/15/18  1217   BP: 166/80   Pulse: 77   Resp:    Temp:    SpO2: 91%   Respirations 15, temp 98    GENERAL: a/o x 4, NAD  SKIN: Warm pink and dry   HEENT:  PERRLA, EOM intact, conjunctiva normal, sclera clear  NECK: supple, no JVD  LUNGS: Clear to auscultation bilaterally without wheezes, rales or rhonchi.  No accessory muscle use and no nasal flaring.  Right upper chest wall tenderness.  Reproduces symptoms.  CARDIAC:  Regular rate and rhythm, S1-S2.  No murmurs, rubs or gallops.  No peripheral edema.  Equal pulses bilaterally.  ABDOMEN: Soft, nontender, nondistended.  No guarding or rebound tenderness.  Normal bowel sounds.  MUSCULOSKELETAL: Moves all extremities well.  No deformity.  NEURO: Cranial nerves II through XII grossly intact.  No gross focal deficits.  Alert.  Normal speech and motor.  PSYCH: Normal mood and affect        Procedures           ED Course  ED Course    EKG         EKG time / Interpretation time: 1110 / 1118  Rhythm/Rate: NSR 96   WI: 132  QRS, axis: 76   QTc 450  ST and T waves: inversion III, aVR, I. Depression   V4,V5, V6 1mm  Interpreted Contemporaneously by me, independently viewed by me and MD.  unchanged compared to prior 2/20/18    Reviewed CXR. Independently viewed by me. Interpreted by radiologist. Discussed with pt.  Xr Chest 1 View    Result Date: 5/15/2018  Narrative: CHEST X-RAY, 5/15/2018  HISTORY: 63-year-old female in the ED complaining of two day history right-sided chest pain.  TECHNIQUE: AP portable upright chest x-ray.  FINDINGS: Heart size and pulmonary vascularity are normal. The lungs are expanded and clear. No visible pulmonary infiltrate or pleural effusion.      Impression: Negative chest.  This report was finalized on 5/15/2018 12:33 PM by Dr. Vj Vargas MD.        Results for orders placed or performed during the hospital encounter of 05/15/18   Comprehensive Metabolic Panel   Result Value Ref Range    Glucose 119 (H) 65 - 99 mg/dL    BUN 16 8 - 23 mg/dL     Creatinine 1.00 0.57 - 1.00 mg/dL    Sodium 140 136 - 145 mmol/L    Potassium 2.8 (L) 3.5 - 5.2 mmol/L    Chloride 99 98 - 107 mmol/L    CO2 30.2 (H) 22.0 - 29.0 mmol/L    Calcium 9.3 8.8 - 10.5 mg/dL    Total Protein 6.5 6.0 - 8.5 g/dL    Albumin 3.60 3.50 - 5.20 g/dL    ALT (SGPT) 16 5 - 33 U/L    AST (SGOT) 15 5 - 32 U/L    Alkaline Phosphatase 84 40 - 129 U/L    Total Bilirubin 0.3 0.2 - 1.2 mg/dL    eGFR Non African Amer 56 (L) >60 mL/min/1.73    Globulin 2.9 gm/dL    A/G Ratio 1.2 g/dL    BUN/Creatinine Ratio 16.0 7.0 - 25.0    Anion Gap 10.8 mmol/L   aPTT   Result Value Ref Range    PTT 30.3 24.3 - 38.1 seconds   Protime-INR   Result Value Ref Range    Protime 14.1 12.1 - 15.0 Seconds    INR 1.09 0.90 - 1.10   Troponin   Result Value Ref Range    Troponin T <0.010 0.000 - 0.030 ng/mL   D-dimer, Quantitative   Result Value Ref Range    D-Dimer, Quantitative 0.33 0.00 - 0.46 MCGFEU/mL   CBC Auto Differential   Result Value Ref Range    WBC 7.63 4.80 - 10.80 10*3/mm3    RBC 4.50 4.20 - 5.40 10*6/mm3    Hemoglobin 12.6 12.0 - 16.0 g/dL    Hematocrit 38.2 37.0 - 47.0 %    MCV 84.9 81.0 - 99.0 fL    MCH 28.0 27.0 - 31.0 pg    MCHC 33.0 31.0 - 37.0 g/dL    RDW 13.1 11.5 - 14.5 %    RDW-SD 40.3 37.0 - 54.0 fl    MPV 10.0 7.4 - 10.4 fL    Platelets 323 140 - 500 10*3/mm3    Neutrophil % 65.9 45.0 - 70.0 %    Lymphocyte % 21.6 20.0 - 45.0 %    Monocyte % 10.5 (H) 3.0 - 8.0 %    Eosinophil % 1.2 0.0 - 4.0 %    Basophil % 0.7 0.0 - 2.0 %    Immature Grans % 0.1 0.0 - 0.5 %    Neutrophils, Absolute 5.03 1.50 - 8.30 10*3/mm3    Lymphocytes, Absolute 1.65 0.60 - 4.80 10*3/mm3    Monocytes, Absolute 0.80 0.00 - 1.00 10*3/mm3    Eosinophils, Absolute 0.09 (L) 0.10 - 0.30 10*3/mm3    Basophils, Absolute 0.05 0.00 - 0.20 10*3/mm3    Immature Grans, Absolute 0.01 0.00 - 0.03 10*3/mm3    nRBC 0.0 0.0 - 0.0 /100 WBC     1300- no cp since arrival. Feels good. No soa. HEART = low risk.  Cp is not typical and does not sound cardiac.  No change in EKG.    Discussed pertinent labs and imaging findings with the patient/family.  Patient/Family voiced understanding of need to follow-up for recheck, further testing as needed.  Return to the emergency Department warnings were given.  D/c with mobic/ robaxin.            ASHISH Mcwilliams differential diagnosis for chest pain includes but is not limited to:  Muscle strain, costochondritis, myositis, pleurisy, rib fracture, intercostal neuritis, herpes zoster, tumor, myocardial infarction, coronary syndrome, unstable angina, angina, aortic dissection, mitral valve prolapse, pericarditis, palpitations, pulmonary embolus, pneumonia, pneumothorax, lung cancer, GERD, esophagitis, esophageal spasm      Final diagnoses:   Atypical chest pain   Hypokalemia       Dictated utilizing Dragon dictation       Kathryn Kaminski PA-C  05/15/18 4131

## 2019-02-11 ENCOUNTER — TRANSCRIBE ORDERS (OUTPATIENT)
Dept: ADMINISTRATIVE | Facility: HOSPITAL | Age: 64
End: 2019-02-11

## 2019-02-11 DIAGNOSIS — Z12.31 VISIT FOR SCREENING MAMMOGRAM: Primary | ICD-10-CM

## 2019-02-11 DIAGNOSIS — Z78.0 POSTMENOPAUSAL: ICD-10-CM

## 2019-02-22 ENCOUNTER — APPOINTMENT (OUTPATIENT)
Dept: BONE DENSITY | Facility: HOSPITAL | Age: 64
End: 2019-02-22

## 2019-02-22 ENCOUNTER — HOSPITAL ENCOUNTER (OUTPATIENT)
Dept: MAMMOGRAPHY | Facility: HOSPITAL | Age: 64
Discharge: HOME | End: 2019-02-22
Admitting: FAMILY MEDICINE

## 2019-02-22 DIAGNOSIS — Z12.31 VISIT FOR SCREENING MAMMOGRAM: ICD-10-CM

## 2019-02-22 DIAGNOSIS — Z78.0 POSTMENOPAUSAL: ICD-10-CM

## 2019-02-22 PROCEDURE — 77080 DXA BONE DENSITY AXIAL: CPT

## 2019-02-22 PROCEDURE — 77063 BREAST TOMOSYNTHESIS BI: CPT

## 2019-02-22 PROCEDURE — 77067 SCR MAMMO BI INCL CAD: CPT

## 2020-06-05 ENCOUNTER — HOSPITAL ENCOUNTER (OUTPATIENT)
Dept: MAMMOGRAPHY | Facility: HOSPITAL | Age: 65
Discharge: HOME | End: 2020-06-05
Admitting: FAMILY MEDICINE

## 2020-06-05 DIAGNOSIS — Z12.39 SCREENING BREAST EXAMINATION: ICD-10-CM

## 2020-06-05 PROCEDURE — 77063 BREAST TOMOSYNTHESIS BI: CPT

## 2020-06-05 PROCEDURE — 77067 SCR MAMMO BI INCL CAD: CPT

## 2021-03-03 ENCOUNTER — OFFICE VISIT (OUTPATIENT)
Dept: ORTHOPEDIC SURGERY | Facility: CLINIC | Age: 66
End: 2021-03-03

## 2021-03-03 VITALS
HEART RATE: 114 BPM | BODY MASS INDEX: 39.15 KG/M2 | DIASTOLIC BLOOD PRESSURE: 108 MMHG | SYSTOLIC BLOOD PRESSURE: 164 MMHG | HEIGHT: 65 IN | WEIGHT: 235 LBS

## 2021-03-03 DIAGNOSIS — M75.51 SUBACROMIAL BURSITIS OF RIGHT SHOULDER JOINT: ICD-10-CM

## 2021-03-03 DIAGNOSIS — M67.911 TENDINOPATHY OF ROTATOR CUFF, RIGHT: Primary | ICD-10-CM

## 2021-03-03 DIAGNOSIS — M19.019 AC JOINT ARTHROPATHY: ICD-10-CM

## 2021-03-03 PROCEDURE — 99203 OFFICE O/P NEW LOW 30 MIN: CPT | Performed by: NURSE PRACTITIONER

## 2021-03-03 RX ORDER — SPIRONOLACTONE AND HYDROCHLOROTHIAZIDE 25; 25 MG/1; MG/1
TABLET ORAL
COMMUNITY
Start: 2021-02-15

## 2021-03-03 RX ORDER — MELOXICAM 15 MG/1
TABLET ORAL
COMMUNITY
Start: 2021-02-23 | End: 2021-03-03

## 2021-03-03 NOTE — PROGRESS NOTES
Subjective:     Patient ID: Virginia Wood is a 65 y.o. female.    Chief Complaint:  Right shoulder injury 12//8/2020  History of Present Illness  Virginia Wood presents to clinic for evaluation of her right shoulder.  She tripped striking the anterolateral aspect of her right shoulder approximately 3 months ago and is continue experiencing pain ever since.  She initially injured the shoulder 3 years ago when she was going down her steps at her home when she caught her foot under her pant leg and she kept holding on by her right shoulder.  She felt a significant pull possibly a pop did not present for evaluation however was sent for physical therapy which did seem to somewhat help regain her strength.  Most recently she was out of town ambulating through customs when she again tripped after she states stubbed her toe on a door jam falling into the wall striking the lateral aspect of the shoulder.  She immediately presented for x-ray imaging at outside facility which she has brought with her available for viewing.  Increased pain noted with forward flexion, attempting to reach back behind her back, reaching out to the side as well.  Increased pain noted at night which she is unable to sleep on the right shoulder secondary to the pain.  She was started on meloxicam and recently discontinued, she has 1 functioning kidney primary care wanted to take her off medication.  Denies any previous MRI, CT.  She is right-hand dominant is a  is experiencing pain on a daily basis when she is driving with the right upper extremity out in front of her.  Does report decreased strength of the right upper extremity.  Denies presence of numbness or tingling right upper extremity.  Rates discomfort 6-7 out of a 10 describes a sharp, aching in nature.  Denies other concerns present this time.    Social History     Occupational History   • Not on file   Tobacco Use   • Smoking status: Never Smoker   • Smokeless tobacco: Never  Used   Substance and Sexual Activity   • Alcohol use: Yes     Comment: OCC   • Drug use: No   • Sexual activity: Defer      Past Medical History:   Diagnosis Date   • Asthma    • Congenital abnormality of kidney    • GERD (gastroesophageal reflux disease)    • Sinusitis    • Uterine polyp    • UTI (urinary tract infection)     on augmentin     Past Surgical History:   Procedure Laterality Date   • APPENDECTOMY     • BLADDER SURGERY     • D&C HYSTEROSCOPY N/A 2/21/2018    Procedure: DILATATION AND CURETTAGE HYSTEROSCOPY WITH POLYPECTOMY AND MYOSURE;  Surgeon: Aleja Maciel MD;  Location: [facility] OR [facility] [place];  Service:    • DILATATION AND CURETTAGE     • LUMBAR DISCECTOMY     • NEPHRECTOMY PARTIAL Left    • TUBAL ABDOMINAL LIGATION         Family History   Problem Relation Age of Onset   • Malig Hyperthermia Neg Hx    • Breast cancer Neg Hx          Review of Systems   Constitutional: Negative for appetite change, chills, diaphoresis, fever and unexpected weight change.   HENT: Negative for hearing loss, nosebleeds, sore throat and tinnitus.    Eyes: Negative for pain and visual disturbance.   Respiratory: Negative for cough, shortness of breath and wheezing.    Cardiovascular: Negative for chest pain and palpitations.   Gastrointestinal: Negative for abdominal pain, diarrhea, nausea and vomiting.   Endocrine: Negative for cold intolerance, heat intolerance and polydipsia.   Genitourinary: Negative for difficulty urinating, dysuria and hematuria.   Musculoskeletal: Positive for arthralgias. Negative for joint swelling and myalgias.   Skin: Negative for rash and wound.   Allergic/Immunologic: Negative for environmental allergies.   Neurological: Negative for dizziness, syncope and numbness.   Hematological: Does not bruise/bleed easily.   Psychiatric/Behavioral: Negative for dysphoric mood and sleep disturbance. The patient is not nervous/anxious.            Objective:  Physical Exam    Vital signs reviewed.   General: No  "acute distress.  Eyes: conjunctiva clear; pupils equally round and reactive  ENT: external ears and nose atraumatic; oropharynx clear  CV: no peripheral edema  Resp: normal respiratory effort  Skin: no rashes or wounds; normal turgor  Psych: mood and affect appropriate; recent and remote memory intact    Vitals:    03/03/21 0949   BP: (!) 164/108   BP Location: Right arm   Pulse: 114   Weight: 107 kg (235 lb)   Height: 165.1 cm (65\")         03/03/21  0949   Weight: 107 kg (235 lb)     Body mass index is 39.11 kg/m².      Right Shoulder Exam     Tenderness   The patient is experiencing tenderness in the acromion and acromioclavicular joint.    Range of Motion   External rotation: 70   Forward flexion: 180     Muscle Strength   Internal rotation: 4/5   External rotation: 4/5   Supraspinatus: 4/5   Subscapularis: 4/5   Biceps: 4/5     Tests   Paiz test: positive  Cross arm: negative  Impingement: positive  Drop arm: negative    Other   Erythema: absent  Sensation: normal  Pulse: present    Comments:  Positive empty can  negative [place]'s  positive Speed's  negative bear hug exam             Imaging:  Independently reviewed 2 view x-ray imaging right shoulder previously completed outside facility negative for acute fracture dislocation, AC joint arthropathy.  No other acute osseous abnormality or dislocation noted on imaging no x-ray imaging available for comparison  Assessment:        1. Tendinopathy of rotator cuff, right    2. Subacromial bursitis of right shoulder joint    3. AC joint arthropathy           Plan:  1. Discussed plan of care with patient. Will proceed with MRI to evaluate for rotator cuff tendon tear.  Plan to see her back in clinic after completion of testing discussed results and further plan of care.  Encouraged to call with any questions concerns she has between on follow-up.  All questions answered.  Orders:  Orders Placed This Encounter   Procedures   • MRI Shoulder Right Without Contrast "       Medications:  No orders of the defined types were placed in this encounter.      Followup:  No follow-ups on file.    Diagnoses and all orders for this visit:    1. Tendinopathy of rotator cuff, right (Primary)  -     MRI Shoulder Right Without Contrast; Future    2. Subacromial bursitis of right shoulder joint  -     MRI Shoulder Right Without Contrast; Future    3. AC joint arthropathy  -     MRI Shoulder Right Without Contrast; Future      I ordered and reviewed the DULCE today.     Dictated utilizing Dragon dictation

## 2021-03-12 ENCOUNTER — HOSPITAL ENCOUNTER (OUTPATIENT)
Dept: MRI IMAGING | Facility: HOSPITAL | Age: 66
Discharge: HOME | End: 2021-03-12

## 2021-03-12 DIAGNOSIS — M67.911 TENDINOPATHY OF ROTATOR CUFF, RIGHT: ICD-10-CM

## 2021-03-12 DIAGNOSIS — M75.51 SUBACROMIAL BURSITIS OF RIGHT SHOULDER JOINT: ICD-10-CM

## 2021-03-12 DIAGNOSIS — M19.019 AC JOINT ARTHROPATHY: ICD-10-CM

## 2021-09-10 ENCOUNTER — TELEPHONE (OUTPATIENT)
Dept: GASTROENTEROLOGY | Facility: CLINIC | Age: 66
End: 2021-09-10

## 2021-09-14 ENCOUNTER — PREP FOR SURGERY (OUTPATIENT)
Dept: OTHER | Facility: HOSPITAL | Age: 66
End: 2021-09-14

## 2021-09-14 DIAGNOSIS — Z12.11 SCREEN FOR COLON CANCER: Primary | ICD-10-CM

## 2021-09-16 NOTE — TELEPHONE ENCOUNTER
Spoke with patient.  Scheduled at [place] on 01/14/2022 at 2:45pm - arrive 1:30pm.  Will mail instructions.

## 2021-09-17 PROBLEM — Z12.11 SCREEN FOR COLON CANCER: Status: ACTIVE | Noted: 2021-09-17

## 2021-12-20 ENCOUNTER — TRANSCRIBE ORDERS (OUTPATIENT)
Dept: ADMINISTRATIVE | Facility: HOSPITAL | Age: 66
End: 2021-12-20

## 2021-12-20 DIAGNOSIS — Z12.31 BREAST CANCER SCREENING BY MAMMOGRAM: Primary | ICD-10-CM

## 2021-12-20 DIAGNOSIS — Z78.0 MENOPAUSE: ICD-10-CM

## 2022-01-10 DIAGNOSIS — Z01.818 OTHER SPECIFIED PRE-OPERATIVE EXAMINATION: Primary | ICD-10-CM

## 2022-01-12 ENCOUNTER — LAB (OUTPATIENT)
Dept: LAB | Facility: HOSPITAL | Age: 67
End: 2022-01-12

## 2022-01-12 ENCOUNTER — APPOINTMENT (OUTPATIENT)
Dept: BONE DENSITY | Facility: HOSPITAL | Age: 67
End: 2022-01-12

## 2022-01-12 ENCOUNTER — HOSPITAL ENCOUNTER (OUTPATIENT)
Dept: MAMMOGRAPHY | Facility: HOSPITAL | Age: 67
End: 2022-01-12

## 2022-01-12 DIAGNOSIS — Z01.818 OTHER SPECIFIED PRE-OPERATIVE EXAMINATION: ICD-10-CM

## 2022-01-12 LAB — SARS-COV-2 RNA PNL SPEC NAA+PROBE: NOT DETECTED

## 2022-01-12 PROCEDURE — 87635 SARS-COV-2 COVID-19 AMP PRB: CPT | Performed by: INTERNAL MEDICINE

## 2022-01-12 PROCEDURE — C9803 HOPD COVID-19 SPEC COLLECT: HCPCS

## 2022-01-13 ENCOUNTER — ANESTHESIA EVENT (OUTPATIENT)
Dept: PERIOP | Facility: HOSPITAL | Age: 67
End: 2022-01-13

## 2022-01-14 ENCOUNTER — HOSPITAL ENCOUNTER (OUTPATIENT)
Facility: HOSPITAL | Age: 67
Setting detail: HOSPITAL OUTPATIENT SURGERY
Discharge: HOME | End: 2022-01-14
Attending: INTERNAL MEDICINE | Admitting: INTERNAL MEDICINE

## 2022-01-14 ENCOUNTER — APPOINTMENT (OUTPATIENT)
Dept: BONE DENSITY | Facility: HOSPITAL | Age: 67
End: 2022-01-14

## 2022-01-14 ENCOUNTER — HOSPITAL ENCOUNTER (OUTPATIENT)
Dept: MAMMOGRAPHY | Facility: HOSPITAL | Age: 67
Discharge: HOME | End: 2022-01-14

## 2022-01-14 ENCOUNTER — ANESTHESIA (OUTPATIENT)
Dept: PERIOP | Facility: HOSPITAL | Age: 67
End: 2022-01-14

## 2022-01-14 VITALS
HEART RATE: 67 BPM | OXYGEN SATURATION: 98 % | BODY MASS INDEX: 37.84 KG/M2 | WEIGHT: 227.4 LBS | RESPIRATION RATE: 15 BRPM | TEMPERATURE: 98.2 F | SYSTOLIC BLOOD PRESSURE: 136 MMHG | DIASTOLIC BLOOD PRESSURE: 79 MMHG

## 2022-01-14 DIAGNOSIS — Z78.0 MENOPAUSE: ICD-10-CM

## 2022-01-14 DIAGNOSIS — Z12.11 SCREEN FOR COLON CANCER: ICD-10-CM

## 2022-01-14 DIAGNOSIS — Z12.31 BREAST CANCER SCREENING BY MAMMOGRAM: ICD-10-CM

## 2022-01-14 LAB — POTASSIUM SERPL-SCNC: 3.7 MMOL/L (ref 3.5–5.2)

## 2022-01-14 PROCEDURE — 77063 BREAST TOMOSYNTHESIS BI: CPT

## 2022-01-14 PROCEDURE — 84132 ASSAY OF SERUM POTASSIUM: CPT | Performed by: NURSE ANESTHETIST, CERTIFIED REGISTERED

## 2022-01-14 PROCEDURE — 25010000002 PROPOFOL 10 MG/ML EMULSION: Performed by: REGISTERED NURSE

## 2022-01-14 PROCEDURE — 45380 COLONOSCOPY AND BIOPSY: CPT | Performed by: INTERNAL MEDICINE

## 2022-01-14 PROCEDURE — 77067 SCR MAMMO BI INCL CAD: CPT

## 2022-01-14 PROCEDURE — 77080 DXA BONE DENSITY AXIAL: CPT

## 2022-01-14 PROCEDURE — 88305 TISSUE EXAM BY PATHOLOGIST: CPT | Performed by: INTERNAL MEDICINE

## 2022-01-14 RX ORDER — SODIUM CHLORIDE 9 MG/ML
40 INJECTION, SOLUTION INTRAVENOUS AS NEEDED
Status: DISCONTINUED | OUTPATIENT
Start: 2022-01-14 | End: 2022-01-14 | Stop reason: HOSPADM

## 2022-01-14 RX ORDER — FLUOXETINE 10 MG/1
20 CAPSULE ORAL DAILY
COMMUNITY
End: 2023-01-17

## 2022-01-14 RX ORDER — SODIUM CHLORIDE 0.9 % (FLUSH) 0.9 %
10 SYRINGE (ML) INJECTION AS NEEDED
Status: DISCONTINUED | OUTPATIENT
Start: 2022-01-14 | End: 2022-01-14 | Stop reason: HOSPADM

## 2022-01-14 RX ORDER — ONDANSETRON 2 MG/ML
4 INJECTION INTRAMUSCULAR; INTRAVENOUS ONCE AS NEEDED
Status: DISCONTINUED | OUTPATIENT
Start: 2022-01-14 | End: 2022-01-14 | Stop reason: HOSPADM

## 2022-01-14 RX ORDER — PRAVASTATIN SODIUM 20 MG
20 TABLET ORAL DAILY
COMMUNITY
End: 2023-01-17 | Stop reason: SDUPTHER

## 2022-01-14 RX ORDER — LIDOCAINE HYDROCHLORIDE 10 MG/ML
0.5 INJECTION, SOLUTION EPIDURAL; INFILTRATION; INTRACAUDAL; PERINEURAL ONCE AS NEEDED
Status: DISCONTINUED | OUTPATIENT
Start: 2022-01-14 | End: 2022-01-14 | Stop reason: HOSPADM

## 2022-01-14 RX ORDER — SODIUM CHLORIDE 0.9 % (FLUSH) 0.9 %
10 SYRINGE (ML) INJECTION EVERY 12 HOURS SCHEDULED
Status: DISCONTINUED | OUTPATIENT
Start: 2022-01-14 | End: 2022-01-14 | Stop reason: HOSPADM

## 2022-01-14 RX ORDER — LIDOCAINE HYDROCHLORIDE 20 MG/ML
INJECTION, SOLUTION INFILTRATION; PERINEURAL AS NEEDED
Status: DISCONTINUED | OUTPATIENT
Start: 2022-01-14 | End: 2022-01-14 | Stop reason: SURG

## 2022-01-14 RX ORDER — PROPOFOL 10 MG/ML
VIAL (ML) INTRAVENOUS AS NEEDED
Status: DISCONTINUED | OUTPATIENT
Start: 2022-01-14 | End: 2022-01-14 | Stop reason: SURG

## 2022-01-14 RX ORDER — SODIUM CHLORIDE, SODIUM LACTATE, POTASSIUM CHLORIDE, CALCIUM CHLORIDE 600; 310; 30; 20 MG/100ML; MG/100ML; MG/100ML; MG/100ML
100 INJECTION, SOLUTION INTRAVENOUS CONTINUOUS
Status: DISCONTINUED | OUTPATIENT
Start: 2022-01-14 | End: 2022-01-14 | Stop reason: HOSPADM

## 2022-01-14 RX ORDER — SODIUM CHLORIDE, SODIUM LACTATE, POTASSIUM CHLORIDE, CALCIUM CHLORIDE 600; 310; 30; 20 MG/100ML; MG/100ML; MG/100ML; MG/100ML
9 INJECTION, SOLUTION INTRAVENOUS CONTINUOUS
Status: DISCONTINUED | OUTPATIENT
Start: 2022-01-14 | End: 2022-01-14 | Stop reason: HOSPADM

## 2022-01-14 RX ADMIN — PROPOFOL 100 MCG/KG/MIN: 10 INJECTION, EMULSION INTRAVENOUS at 10:45

## 2022-01-14 RX ADMIN — LIDOCAINE HYDROCHLORIDE 50 MG: 20 INJECTION, SOLUTION INFILTRATION; PERINEURAL at 10:44

## 2022-01-14 RX ADMIN — SODIUM CHLORIDE, POTASSIUM CHLORIDE, SODIUM LACTATE AND CALCIUM CHLORIDE 9 ML/HR: 600; 310; 30; 20 INJECTION, SOLUTION INTRAVENOUS at 09:01

## 2022-01-14 RX ADMIN — PROPOFOL 100 MG: 10 INJECTION, EMULSION INTRAVENOUS at 10:44

## 2022-01-14 NOTE — ANESTHESIA POSTPROCEDURE EVALUATION
Patient: Virginia Wood    Procedure Summary     Date: 01/14/22 Room / Location: [facility] ENDOSCOPY 1 /  [facility] OR    Anesthesia Start: 1044 Anesthesia Stop: 1117    Procedure: COLONOSCOPY WITH POLYPECTOMY (N/A ) Diagnosis:       Screen for colon cancer      Diverticulosis      Colon polyp      (Screen for colon cancer [Z12.11])    Surgeons: Chester Patten MD Provider: Aquiles Velasco CRNA    Anesthesia Type: MAC ASA Status: 2          Anesthesia Type: MAC    Vitals  Vitals Value Taken Time   /79 01/14/22 1150   Temp 98.2 °F (36.8 °C) 01/14/22 1121   Pulse 67 01/14/22 1150   Resp 15 01/14/22 1150   SpO2 98 % 01/14/22 1150           Post Anesthesia Care and Evaluation    Patient location during evaluation: PHASE II  Patient participation: complete - patient participated  Level of consciousness: awake and alert  Pain score: 0  Pain management: adequate  Airway patency: patent  Anesthetic complications: No anesthetic complications  PONV Status: none  Cardiovascular status: acceptable  Respiratory status: acceptable  Hydration status: acceptable

## 2022-01-14 NOTE — BRIEF OP NOTE
COLONOSCOPY WITH POLYPECTOMY  Progress Note    Virginia Wood  1/14/2022    Pre-op Diagnosis:   Screen for colon cancer [Z12.11]       Post-Op Diagnosis Codes:     * Screen for colon cancer [Z12.11]     * Diverticulosis [K57.90]     * Colon polyp [K63.5]    Procedure/CPT® Codes:        Procedure(s):  COLONOSCOPY WITH POLYPECTOMY    Surgeon(s):  Chester Patten MD    Anesthesia: Monitored Anesthesia Care    Staff:   Circulator: Blank Cruz RN  Scrub Person: Lulú Gonzalez         Estimated Blood Loss: none    Urine Voided: * No values recorded between 1/14/2022 10:44 AM and 1/14/2022 11:13 AM *    Specimens:                Specimens     ID Source Type Tests Collected By Collected At Frozen?    A Large Intestine, Cecum Polyp · TISSUE PATHOLOGY EXAM   Chester Patten MD 1/14/22 1103     Description: Cecal polyp x 1    B Large Intestine, Sigmoid Colon Polyp · TISSUE PATHOLOGY EXAM   Chester Patten MD 1/14/22 1109     Description: Sigmoid polyp x 1                Drains: * No LDAs found *    Findings: Colon to TI good Prep  Sigmoid Diverticulosis  [id number]    Complications: None          Chester Patten MD     Date: 1/14/2022  Time: 11:16 EST

## 2022-01-14 NOTE — H&P
Patient Care Team:  Martin Peterson MD as PCP - General (Family Medicine)    CHIEF COMPLAINT: Screening CRC    HISTORY OF PRESENT ILLNESS:  First exam    Past Medical History:   Diagnosis Date   • Asthma    • Congenital abnormality of kidney    • GERD (gastroesophageal reflux disease)    • Sinusitis    • Uterine polyp    • UTI (urinary tract infection)     on augmentin     Past Surgical History:   Procedure Laterality Date   • APPENDECTOMY     • BLADDER SURGERY     • D & C HYSTEROSCOPY N/A 2/21/2018    Procedure: DILATATION AND CURETTAGE HYSTEROSCOPY WITH POLYPECTOMY AND MYOSURE;  Surgeon: Aleja Maciel MD;  Location: [facility] OR [facility] [place];  Service:    • DILATATION AND CURETTAGE     • LUMBAR DISCECTOMY     • NEPHRECTOMY PARTIAL Left    • TUBAL ABDOMINAL LIGATION       Family History   Problem Relation Age of Onset   • Malig Hyperthermia Neg Hx    • Breast cancer Neg Hx      Social History     Tobacco Use   • Smoking status: Never Smoker   • Smokeless tobacco: Never Used   Vaping Use   • Vaping Use: Never used   Substance Use Topics   • Alcohol use: Yes     Comment: OCC   • Drug use: No     Medications Prior to Admission   Medication Sig Dispense Refill Last Dose   • FLUoxetine (PROzac) 10 MG capsule Take 10 mg by mouth Daily.   1/12/2022   • pravastatin (PRAVACHOL) 20 MG tablet Take 20 mg by mouth Daily.   1/12/2022   • omeprazole (priLOSEC) 20 MG capsule Take 20 mg by mouth Daily.   1/12/2022   • spironolactone-hydrochlorothiazide (ALDACTAZIDE) 25-25 MG tablet TAKE 1 TABLET IN THE MORNING FOR SWELLING   1/11/2022     Allergies:  Nickel    REVIEW OF SYSTEMS:  Please see the above history of present illness for pertinent positives and negatives.  The remainder of the patient's systems have been reviewed and are negative.     Vital Signs  Temp:  [98.1 °F (36.7 °C)] 98.1 °F (36.7 °C)  Heart Rate:  [72] 72  Resp:  [16] 16  BP: (143)/(82) 143/82    Flowsheet Rows      First Filed Value   Admission Height --   Admission Weight  103 kg (227 lb 6.4 oz) Documented at 01/14/2022 0827           Physical Exam:  Physical Exam   Constitutional: Patient appears well-developed and well-nourished and in no acute distress   HEENT:   Head: Normocephalic and atraumatic.   Eyes:  Pupils are equal, round, and reactive to light. EOM are intact. Sclerae are anicteric and non-injected.  Mouth and Throat: Patient has moist mucous membranes. Oropharynx is clear of any erythema or exudate.     Neck: Neck supple. No JVD present. No thyromegaly present. No lymphadenopathy present.  Cardiovascular: Regular rate, regular rhythm, S1 normal and S2 normal.  Exam reveals no gallop and no friction rub.  No murmur heard.  Pulmonary/Chest: Lungs are clear to auscultation bilaterally. No respiratory distress. No wheezes. No rhonchi. No rales.   Abdominal: Soft. Bowel sounds are normal. No distension and no mass. There is no hepatosplenomegaly. There is no tenderness.   Musculoskeletal: Normal Muscle tone  Extremities: No edema. Pulses are palpable in all 4 extremities.  Neurological: Patient is alert and oriented to person, place, and time. Cranial nerves II-XII are grossly intact with no focal deficits.  Skin: Skin is warm. No rash noted. Nails show no clubbing.  No cyanosis or erythema.    Debilities/Disabilities Identified: None  Emotional Behavior: Appropriate     Results Review:   I reviewed the patient's new clinical results.    Lab Results (most recent)     Procedure Component Value Units Date/Time    Potassium [[id number]]  (Normal) Collected: 01/14/22 0825    Specimen: Blood Updated: 01/14/22 0927     Potassium 3.7 mmol/L           Imaging Results (Most Recent)     None        reviewed    ECG/EMG Results (most recent)     None        reviewed    Assessment/Plan   Screening CRC/  colonoscopy      I discussed the patient's findings and my recommendations with patient.     Chester Patten MD  01/14/22  10:46 EST    Time: 10 min prior to procedure.

## 2022-01-14 NOTE — ANESTHESIA PREPROCEDURE EVALUATION
Anesthesia Evaluation     Patient summary reviewed and Nursing notes reviewed   no history of anesthetic complications:  NPO Solid Status: > 8 hours  NPO Liquid Status: > 8 hours           Airway   Mallampati: I  TM distance: >3 FB  Neck ROM: full  No difficulty expected and Narrow palate  Dental    (+) upper dentures and partials    Comment: Lower partial    Pulmonary - normal exam    breath sounds clear to auscultation  (+) asthma (no inhalers),  Sleep apnea: snores occ.  Cardiovascular - negative cardio ROS and normal exam  Exercise tolerance: good (4-7 METS)    Rhythm: regular  Rate: normal        Neuro/Psych- negative ROS  GI/Hepatic/Renal/Endo    (+)  GERD well controlled,  renal disease (congenital left kidney issue-removed. Only one kidney.),     Musculoskeletal (-) negative ROS    Back pain: discectomy L4-5.  Abdominal   (+) obese,    Substance History   Alcohol use: rare.     OB/GYN negative ob/gyn ROS         Other        Arthritis: knees and shoulders.  ROS/Med Hx Other: On diuretic for lower extremity edema                  Anesthesia Plan    ASA 2     MAC     intravenous induction     Anesthetic plan, all risks, benefits, and alternatives have been provided, discussed and informed consent has been obtained with: patient.

## 2022-01-17 LAB
LAB AP CASE REPORT: NORMAL
PATH REPORT.FINAL DX SPEC: NORMAL
PATH REPORT.GROSS SPEC: NORMAL

## 2022-04-18 ENCOUNTER — OFFICE VISIT (OUTPATIENT)
Dept: ORTHOPEDIC SURGERY | Facility: CLINIC | Age: 67
End: 2022-04-18

## 2022-04-18 VITALS
SYSTOLIC BLOOD PRESSURE: 131 MMHG | BODY MASS INDEX: 36.99 KG/M2 | HEART RATE: 80 BPM | HEIGHT: 65 IN | WEIGHT: 222 LBS | DIASTOLIC BLOOD PRESSURE: 86 MMHG

## 2022-04-18 DIAGNOSIS — M25.511 BILATERAL SHOULDER PAIN, UNSPECIFIED CHRONICITY: Primary | ICD-10-CM

## 2022-04-18 DIAGNOSIS — M75.51 SUBACROMIAL BURSITIS OF RIGHT SHOULDER JOINT: ICD-10-CM

## 2022-04-18 DIAGNOSIS — M25.512 BILATERAL SHOULDER PAIN, UNSPECIFIED CHRONICITY: Primary | ICD-10-CM

## 2022-04-18 DIAGNOSIS — M19.019 AC JOINT ARTHROPATHY: ICD-10-CM

## 2022-04-18 DIAGNOSIS — M67.911 TENDINOPATHY OF ROTATOR CUFF, RIGHT: ICD-10-CM

## 2022-04-18 DIAGNOSIS — M75.52 SUBACROMIAL BURSITIS OF LEFT SHOULDER JOINT: ICD-10-CM

## 2022-04-18 DIAGNOSIS — M67.912 TENDINOPATHY OF ROTATOR CUFF, LEFT: ICD-10-CM

## 2022-04-18 PROCEDURE — 20610 DRAIN/INJ JOINT/BURSA W/O US: CPT | Performed by: NURSE PRACTITIONER

## 2022-04-18 PROCEDURE — 99214 OFFICE O/P EST MOD 30 MIN: CPT | Performed by: NURSE PRACTITIONER

## 2022-04-18 PROCEDURE — 73030 X-RAY EXAM OF SHOULDER: CPT | Performed by: NURSE PRACTITIONER

## 2022-04-18 RX ORDER — LIDOCAINE HYDROCHLORIDE 10 MG/ML
4 INJECTION, SOLUTION EPIDURAL; INFILTRATION; INTRACAUDAL; PERINEURAL
Status: COMPLETED | OUTPATIENT
Start: 2022-04-18 | End: 2022-04-18

## 2022-04-18 RX ORDER — TRIAMCINOLONE ACETONIDE 40 MG/ML
80 INJECTION, SUSPENSION INTRA-ARTICULAR; INTRAMUSCULAR
Status: COMPLETED | OUTPATIENT
Start: 2022-04-18 | End: 2022-04-18

## 2022-04-18 RX ADMIN — LIDOCAINE HYDROCHLORIDE 4 ML: 10 INJECTION, SOLUTION EPIDURAL; INFILTRATION; INTRACAUDAL; PERINEURAL at 09:48

## 2022-04-18 RX ADMIN — TRIAMCINOLONE ACETONIDE 80 MG: 40 INJECTION, SUSPENSION INTRA-ARTICULAR; INTRAMUSCULAR at 09:48

## 2022-04-18 NOTE — PROGRESS NOTES
Subjective:     Patient ID: Virginia Wood is a 66 y.o. female.    Chief Complaint:  Left shoulder pain, new issue to examiner   Follow-up rotator cuff tendinopathy right shoulder, subacromial bursitis right shoulder  History of Present Illness  Virginia Wood    The patient presents to clinic in follow-up regarding her right shoulder. She is now experiencing pain in her left shoulder; greater than that of her right shoulder. The patient localizes tenderness to the acromion bilateral upper extremities which does radiate inferiorly to her mid humerus and stops at that point. She denies pain radiating into her neck. Increased pain noted when attempting to sleep at night-time on either shoulder. Has taken aleve and ibuprofen intermittently without significant symptom relief. The patient is not experiencing numbness, or tingling to her bilateral upper extremities. She does, however, reports a burning and aching sensation that is occasionally stabbing in nature. The patient does report decreased strength of her left upper extremity. She as encouraged to follow-up for MRI of right shoulder 1 year ago; however, did note symptom improvement. The patient does continue working as a  . Not experiencing severe pain with activity, or driving. She does note some discomfort with the shoulder abducted with such as when when resting to her left upper extremity; however, this is mostly present at night-time. The patient denies previous corticosteroid injection. Denies any other concerns at this time.     Social History     Occupational History   • Not on file   Tobacco Use   • Smoking status: Never Smoker   • Smokeless tobacco: Never Used   Vaping Use   • Vaping Use: Never used   Substance and Sexual Activity   • Alcohol use: Yes     Comment: OCC   • Drug use: No   • Sexual activity: Defer      Past Medical History:   Diagnosis Date   • Asthma    • Congenital abnormality of kidney    • GERD (gastroesophageal reflux disease)  "   • Sinusitis    • Uterine polyp    • UTI (urinary tract infection)     on augmentin     Past Surgical History:   Procedure Laterality Date   • APPENDECTOMY     • BLADDER SURGERY     • COLONOSCOPY W/ POLYPECTOMY N/A 1/14/2022    Procedure: COLONOSCOPY WITH POLYPECTOMY;  Surgeon: Chester Patten MD;  Location:  [facility] OR;  Service: Gastroenterology;  Laterality: N/A;  Cecal polyp x 1; Sigmoid polyp x 1   • D & C HYSTEROSCOPY N/A 2/21/2018    Procedure: DILATATION AND CURETTAGE HYSTEROSCOPY WITH POLYPECTOMY AND MYOSURE;  Surgeon: Aleja Maciel MD;  Location:  [facility];  Service:    • DILATATION AND CURETTAGE     • LUMBAR DISCECTOMY     • NEPHRECTOMY PARTIAL Left    • TUBAL ABDOMINAL LIGATION         Family History   Problem Relation Age of Onset   • Malig Hyperthermia Neg Hx    • Breast cancer Neg Hx      Objective:  Physical Exam    Vital signs reviewed.   General: No acute distress.  Eyes: conjunctiva clear; pupils equally round and reactive  ENT: external ears and nose atraumatic; oropharynx clear  CV: no peripheral edema  Resp: normal respiratory effort  Skin: no rashes or wounds; normal turgor  Psych: mood and affect appropriate; recent and remote memory intact    Vitals:    04/18/22 0918   BP: 131/86   Pulse: 80   Weight: 101 kg (222 lb)   Height: 165.1 cm (65\")         04/18/22 0918   Weight: 101 kg (222 lb)     Body mass index is 36.94 kg/m².      Right Shoulder Exam     Tenderness   The patient is experiencing tenderness in the acromion.    Range of Motion   External rotation: 80   Forward flexion: 180   Internal rotation 0 degrees: L1     Muscle Strength   Internal rotation: 4/5   External rotation: 4/5   Supraspinatus: 4/5   Subscapularis: 4/5   Biceps: 4/5     Tests   Paiz test: positive  Cross arm: negative  Impingement: positive  Drop arm: negative    Other   Erythema: absent  Sensation: normal  Pulse: present    Comments:  Negative empty can  negative [place]'s  positive " Speed's  negative bear hug exam        Left Shoulder Exam     Tenderness   The patient is experiencing tenderness in the acromion.    Range of Motion   External rotation: 80   Forward flexion: 180   Internal rotation 0 degrees: L1     Muscle Strength   Internal rotation: 4/5   External rotation: 4/5   Supraspinatus: 4/5   Subscapularis: 4/5   Biceps: 4/5     Tests   Paiz test: positive  Cross arm: negative  Impingement: positive  Drop arm: negative    Comments:  Mildly positive empty can  negative [place]'s  positive Speed's  negative bear hug exam                   Imaging:  Bilateral Shoulder X-Ray  Indication: Pain  AP Internal and External Rotation views    Findings:  No fracture  No bony lesion  Normal soft tissues  AC joint arthropathy   No prior studies were available for comparison.    Assessment:        1. Bilateral shoulder pain, unspecified chronicity    2. Tendinopathy of rotator cuff, right    3. Subacromial bursitis of right shoulder joint    4. AC joint arthropathy    5. Tendinopathy of rotator cuff, left    6. Subacromial bursitis of left shoulder joint           Plan:  Large Joint Arthrocentesis  Date/Time: 4/18/2022 9:48 AM  Consent given by: patient  Site marked: site marked  Timeout: Immediately prior to procedure a time out was called to verify the correct patient, procedure, equipment, support staff and site/side marked as required   Supporting Documentation  Indications: pain   Procedure Details  Location: shoulder (Bilateral SA) - Shoulder joint: bilateral SA.  Preparation: Patient was prepped and draped in the usual sterile fashion  Needle size: 22 G  Approach: lateral  Medications administered: 4 mL lidocaine PF 1% 1 %; 80 mg triamcinolone acetonide 40 MG/ML  Patient tolerance: patient tolerated the procedure well with no immediate complications          1. Discussed treatment options with patient.  Discussed to referral for MRI right shoulder versus corticosteroid injections bilateral  shoulders.  Given the significant pain that she is experiencing at night at this time wishes to proceed with application of ice 20 minutes on 20 minutes off this evening.  Corticosteroid injection subacromial bursa bilateral shoulders.  I will see her back in clinic in 4 weeks to reevaluate.  Urged to call with any questions concerns she has between now and follow-up.  All questions answered.  Orders:  Orders Placed This Encounter   Procedures   • Large Joint Arthrocentesis   • XR shoulder 2+ vw bilateral     No orders of the defined types were placed in this encounter.          I ordered and reviewed the DULCE today.       Transcribed from ambient dictation for KENIA Richardson by Katiuska Garces.  04/18/22   08:09 EDT    Patient verbalized consent to the visit recording.  I have personally performed the services described in this document as transcribed by the above individual, and it is both accurate and complete.  Katiuska Garces  4/18/2022  10:01 EDT

## 2022-05-16 ENCOUNTER — OFFICE VISIT (OUTPATIENT)
Dept: ORTHOPEDIC SURGERY | Facility: CLINIC | Age: 67
End: 2022-05-16

## 2022-05-16 VITALS — HEIGHT: 65 IN | BODY MASS INDEX: 36.99 KG/M2 | WEIGHT: 222 LBS

## 2022-05-16 DIAGNOSIS — M75.52 SUBACROMIAL BURSITIS OF LEFT SHOULDER JOINT: ICD-10-CM

## 2022-05-16 DIAGNOSIS — M19.019 AC JOINT ARTHROPATHY: ICD-10-CM

## 2022-05-16 DIAGNOSIS — M67.912 TENDINOPATHY OF ROTATOR CUFF, LEFT: ICD-10-CM

## 2022-05-16 DIAGNOSIS — M67.911 TENDINOPATHY OF ROTATOR CUFF, RIGHT: Primary | ICD-10-CM

## 2022-05-16 DIAGNOSIS — M75.51 SUBACROMIAL BURSITIS OF RIGHT SHOULDER JOINT: ICD-10-CM

## 2022-05-16 PROCEDURE — 99213 OFFICE O/P EST LOW 20 MIN: CPT | Performed by: NURSE PRACTITIONER

## 2022-05-16 RX ORDER — HYDROCODONE BITARTRATE AND ACETAMINOPHEN 5; 325 MG/1; MG/1
TABLET ORAL
COMMUNITY
Start: 2022-05-13 | End: 2022-06-06

## 2022-05-16 RX ORDER — AMOXICILLIN 500 MG/1
CAPSULE ORAL
COMMUNITY
Start: 2022-05-13 | End: 2022-06-06

## 2022-05-16 NOTE — PROGRESS NOTES
Subjective:     Patient ID: Virginia Wood is a 67 y.o. female.    Chief Complaint:  Follow-up bilateral shoulders  Right shoulder rotator cuff tendinopathy, subacromial bursitis  Left shoulder rotator cuff tendinopathy, subacromial bursitis  Corticosteroid injection subacromial bursa bilateral shoulders completed 4/18/2022  History of Present Illness  Virginia Wood returns clinic for follow-up of bilateral shoulders.  Received corticosteroid injection last visit bilateral shoulders 4/18/2022 with 100% symptom relief.  Is been able to return to all previously tolerated activities.  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  Symptoms have completely resolved very pleased with symptom relief with the corticosteroid injections.  Has continued with range of motion exercises bilateral shoulders.  Denies other concerns present time.    Social History     Occupational History   • Not on file   Tobacco Use   • Smoking status: Never Smoker   • Smokeless tobacco: Never Used   Vaping Use   • Vaping Use: Never used   Substance and Sexual Activity   • Alcohol use: Yes     Comment: OCC   • Drug use: No   • Sexual activity: Defer      Past Medical History:   Diagnosis Date   • Asthma    • Congenital abnormality of kidney    • GERD (gastroesophageal reflux disease)    • Sinusitis    • Uterine polyp    • UTI (urinary tract infection)     on augmentin     Past Surgical History:   Procedure Laterality Date   • APPENDECTOMY     • BLADDER SURGERY     • COLONOSCOPY W/ POLYPECTOMY N/A 1/14/2022    Procedure: COLONOSCOPY WITH POLYPECTOMY;  Surgeon: Chester Patten MD;  Location: [facility];  Service: Gastroenterology;  Laterality: N/A;  Cecal polyp x 1; Sigmoid polyp x 1   • D & C HYSTEROSCOPY N/A 2/21/2018    Procedure: DILATATION AND CURETTAGE HYSTEROSCOPY WITH POLYPECTOMY AND MYOSURE;  Surgeon: Aleja Maciel MD;  Location: [facility]  "[facility];  Service:    • DILATATION AND CURETTAGE     • LUMBAR DISCECTOMY     • NEPHRECTOMY PARTIAL Left    • TUBAL ABDOMINAL LIGATION         Family History   Problem Relation Age of Onset   • Malig Hyperthermia Neg Hx    • Breast cancer Neg Hx        Objective:  Physical Exam  General: No acute distress.  Eyes: conjunctiva clear; pupils equally round and reactive  ENT: external ears and nose atraumatic; oropharynx clear  CV: no peripheral edema  Resp: normal respiratory effort  Skin: no rashes or wounds; normal turgor  Psych: mood and affect appropriate; recent and remote memory intact    Vitals:    05/16/22 0924   Weight: 101 kg (222 lb)   Height: 165.1 cm (65\")         05/16/22 0924   Weight: 101 kg (222 lb)     Body mass index is 36.94 kg/m².      Right Shoulder Exam     Range of Motion   External rotation: 90   Forward flexion: 180   Internal rotation 0 degrees: L1     Tests   Cross arm: negative  Drop arm: negative    Other   Erythema: absent  Sensation: normal  Pulse: present    Comments:  Internal strength 4+ out of 5  External strength 4+ out of 5  Subscapularis strength 4+ out of 5  Supraspinatus strength 4+ out of 5  Bicep strength 4+  negative empty can  negative [place]'s  negative Speed's        Left Shoulder Exam     Range of Motion   External rotation: 90   Forward flexion: 180   Internal rotation 0 degrees: L1     Tests   Cross arm: negative  Drop arm: negative    Other   Erythema: absent  Sensation: normal  Pulse: present     Comments:  Internal strength 4+ out of 5  External strength 4+ out of 5  Subscapularis strength 4+ out of 5  Supraspinatus strength 4+ out of 5  Bicep strength 4+/5  negative empty can  negative [place]'s  negative Speed's                Assessment:        1. Tendinopathy of rotator cuff, right    2. Subacromial bursitis of right shoulder joint    3. Tendinopathy of rotator cuff, left    4. Subacromial bursitis of left shoulder joint    5. AC joint arthropathy       "     Plan:  1. Discussed plan of care with patient.  Discussed treatment options going forward.  Proceed with corticosteroid injections as needed do not complete any more frequently than once every 3 months.  Discussed to continue with range of motion strengthening bilateral upper extremities.  We will plan to see her back in clinic as needed.  All questions answered.  Orders:  No orders of the defined types were placed in this encounter.    No orders of the defined types were placed in this encounter.        Dragon dictation utilized.

## 2022-07-26 ENCOUNTER — TELEPHONE (OUTPATIENT)
Dept: ORTHOPEDIC SURGERY | Facility: CLINIC | Age: 67
End: 2022-07-26

## 2022-07-26 NOTE — TELEPHONE ENCOUNTER
Caller: Virginia Wood    Relationship to patient: Self    Best call back number: [phone]    Chief complaint: BILATERAL SHOULDER PAIN    Type of visit: FOLLOW UP/CORTISONE INJECTION    Requested date: ON A Monday OR EARLY Tuesday MORNING

## 2022-08-09 ENCOUNTER — OFFICE VISIT (OUTPATIENT)
Dept: ORTHOPEDIC SURGERY | Facility: CLINIC | Age: 67
End: 2022-08-09

## 2022-08-09 VITALS — HEIGHT: 65 IN | BODY MASS INDEX: 35.99 KG/M2 | WEIGHT: 216 LBS

## 2022-08-09 DIAGNOSIS — M75.51 SUBACROMIAL BURSITIS OF RIGHT SHOULDER JOINT: ICD-10-CM

## 2022-08-09 DIAGNOSIS — M67.912 TENDINOPATHY OF ROTATOR CUFF, LEFT: ICD-10-CM

## 2022-08-09 DIAGNOSIS — M67.911 TENDINOPATHY OF ROTATOR CUFF, RIGHT: Primary | ICD-10-CM

## 2022-08-09 DIAGNOSIS — M19.019 AC JOINT ARTHROPATHY: ICD-10-CM

## 2022-08-09 DIAGNOSIS — M75.52 SUBACROMIAL BURSITIS OF LEFT SHOULDER JOINT: ICD-10-CM

## 2022-08-09 PROCEDURE — 20610 DRAIN/INJ JOINT/BURSA W/O US: CPT | Performed by: NURSE PRACTITIONER

## 2022-08-09 PROCEDURE — 99213 OFFICE O/P EST LOW 20 MIN: CPT | Performed by: NURSE PRACTITIONER

## 2022-08-09 RX ORDER — LIDOCAINE HYDROCHLORIDE 10 MG/ML
4 INJECTION, SOLUTION EPIDURAL; INFILTRATION; INTRACAUDAL; PERINEURAL
Status: COMPLETED | OUTPATIENT
Start: 2022-08-09 | End: 2022-08-09

## 2022-08-09 RX ORDER — TRIAMCINOLONE ACETONIDE 40 MG/ML
80 INJECTION, SUSPENSION INTRA-ARTICULAR; INTRAMUSCULAR
Status: COMPLETED | OUTPATIENT
Start: 2022-08-09 | End: 2022-08-09

## 2022-08-09 RX ADMIN — LIDOCAINE HYDROCHLORIDE 4 ML: 10 INJECTION, SOLUTION EPIDURAL; INFILTRATION; INTRACAUDAL; PERINEURAL at 08:59

## 2022-08-09 RX ADMIN — TRIAMCINOLONE ACETONIDE 80 MG: 40 INJECTION, SUSPENSION INTRA-ARTICULAR; INTRAMUSCULAR at 08:59

## 2022-08-09 NOTE — PROGRESS NOTES
Subjective:     Patient ID: Virginia Wood is a 67 y.o. female.    Chief Complaint:  Right shoulder rotator cuff tendinopathy, subacromial bursitis  Left shoulder rotator cuff tendinopathy, subacromial bursitis  Corticosteroid injection bilateral shoulders 4/18/2022  History of Present Illness  Virginia Wood    The patient presents today for a follow-up evaluation of acute onset of pain in her bilateral shoulders. Maximal tenderness is present along the lateral aspect of her bilateral shoulders. She began experiencing discomfort upon waking at the left shoulder lateral aspect approximately 3 weeks ago followed by the right and has progressively gotten worse over the last 2 weeks. She received corticosteroid injections in 04/2022 in her bilateral shoulders with 100% symptom relief. She is currently experiencing discomfort when she is attempting to drive with reaching the arm out in the front and doing any side to side motion. She is also experiencing pain at the right shoulder when reaching out to the side or reaching back behind her. She is noticing decreased strength at the right upper extremity. She denies any presence of numbness or tingling in her bilateral upper extremities. Again, she localizes maximal tenderness to the lateral and posterolateral aspect of the shoulder. Mild symptom relief with rest. Worse pain noted at night when she is attempting to get into a comfortable position. She is a , however, does not lift, pull, or push significant weight. She denies all other concerns present at this time.     Social History     Occupational History   • Not on file   Tobacco Use   • Smoking status: Never Smoker   • Smokeless tobacco: Never Used   Vaping Use   • Vaping Use: Never used   Substance and Sexual Activity   • Alcohol use: Yes     Comment: OCC   • Drug use: No   • Sexual activity: Defer      Past Medical History:   Diagnosis Date   • Asthma    • Congenital abnormality of kidney    • GERD  "(gastroesophageal reflux disease)    • Sinusitis    • Uterine polyp    • UTI (urinary tract infection)     on augmentin     Past Surgical History:   Procedure Laterality Date   • APPENDECTOMY     • BLADDER SURGERY     • COLONOSCOPY W/ POLYPECTOMY N/A 1/14/2022    Procedure: COLONOSCOPY WITH POLYPECTOMY;  Surgeon: Chester Patten MD;  Location: [facility];  Service: Gastroenterology;  Laterality: N/A;  Cecal polyp x 1; Sigmoid polyp x 1   • D & C HYSTEROSCOPY N/A 2/21/2018    Procedure: DILATATION AND CURETTAGE HYSTEROSCOPY WITH POLYPECTOMY AND MYOSURE;  Surgeon: Aleja Maciel MD;  Location:  [facility];  Service:    • DILATATION AND CURETTAGE     • LUMBAR DISCECTOMY     • NEPHRECTOMY PARTIAL Left    • TUBAL ABDOMINAL LIGATION         Family History   Problem Relation Age of Onset   • Malig Hyperthermia Neg Hx    • Breast cancer Neg Hx                Objective:  Physical Exam    General: No acute distress.  Eyes: conjunctiva clear; pupils equally round and reactive  ENT: external ears and nose atraumatic; oropharynx clear  CV: no peripheral edema  Resp: normal respiratory effort  Skin: no rashes or wounds; normal turgor  Psych: mood and affect appropriate; recent and remote memory intact    Vitals:    08/09/22 0841   Weight: 98 kg (216 lb)   Height: 165.1 cm (65\")         08/09/22  0841   Weight: 98 kg (216 lb)     Body mass index is 35.94 kg/m².      Right Shoulder Exam     Tenderness   The patient is experiencing tenderness in the acromion.    Range of Motion   External rotation: 80   Forward flexion: 180   Internal rotation 0 degrees: L5     Tests   Paiz test: positive  Cross arm: negative  Impingement: positive  Drop arm: negative    Other   Erythema: absent  Sensation: normal  Pulse: present    Comments:  Internal rotation strength 4+ out of 5  External rotation strength 4+ out of 5  Supraspinatus strength 4+ out of 5 except subscapularis strength 4+ out of 5  Bicep strength 4+ out of 5  Mildly " positive empty can  negative [place]'s  Mildly positive Speed's  negative bear hug exam        Left Shoulder Exam     Range of Motion   External rotation: 80   Forward flexion: 180   Internal rotation 0 degrees: L5     Tests   Paiz test: positive  Cross arm: negative  Impingement: positive    Other   Erythema: absent  Sensation: normal  Pulse: present     Comments:  Internal rotation strength 4+ out of 5  External rotation strength 4+ out of 5  Supraspinatus strength 4+ out of 5 except subscapularis strength 4+ out of 5  Bicep strength 4+ out of 5  Mildly positive empty can  negative [place]'s  Mildly positive Speed's  negative bear hug exam              Assessment:        1. Tendinopathy of rotator cuff, right    2. Subacromial bursitis of right shoulder joint    3. Tendinopathy of rotator cuff, left    4. Subacromial bursitis of left shoulder joint    5. AC joint arthropathy           Plan:    Large Joint Arthrocentesis  Date/Time: 8/9/2022 8:59 AM  Consent given by: patient  Site marked: site marked  Timeout: Immediately prior to procedure a time out was called to verify the correct patient, procedure, equipment, support staff and site/side marked as required   Supporting Documentation  Indications: pain   Procedure Details  Location: shoulder - Shoulder joint: Bilateral subacromial bursa.  Preparation: Patient was prepped and draped in the usual sterile fashion  Needle size: 22 G  Approach: lateral  Medications administered: 80 mg triamcinolone acetonide 40 MG/ML; 4 mL lidocaine PF 1% 1 %  Patient tolerance: patient tolerated the procedure well with no immediate complications          Discussed treatment options with patient.  Discussed corticosteroid injections bilateral shoulders, subacromial bursa, lateral approach which she does wish to proceed with.  Discussed if she is not improving we will proceed with MRI to evaluate for cuff tear.  Discussed corticosteroid injections every 3 months as needed but  recommend follow-up if pain returns.  All questions answered.    Orders:  Orders Placed This Encounter   Procedures   • Large Joint Arthrocentesis     No orders of the defined types were placed in this encounter.        Transcribed from ambient dictation for KENIA Richardson by Yahir Donald.  08/09/22   10:15 EDT    Patient verbalized consent to the visit recording.

## 2022-11-23 ENCOUNTER — TELEPHONE (OUTPATIENT)
Dept: ORTHOPEDIC SURGERY | Facility: CLINIC | Age: 67
End: 2022-11-23

## 2022-11-23 NOTE — TELEPHONE ENCOUNTER
Caller: FLORINDA PALACIOS    Relationship to patient: SELF    Best call back number: [phone]    Chief complaint: RIGHT SHOULDER    Type of visit: INJECTION    Requested date: PT NEEDS IT TO BE ON A MONDAY    If rescheduling, when is the original appointment: N/A    Additional notes: PLEASE CALL PATIENT TO SCHEDULE

## 2022-12-05 ENCOUNTER — OFFICE VISIT (OUTPATIENT)
Dept: ORTHOPEDIC SURGERY | Facility: CLINIC | Age: 67
End: 2022-12-05

## 2022-12-05 VITALS — WEIGHT: 216 LBS | HEIGHT: 65 IN | BODY MASS INDEX: 35.99 KG/M2

## 2022-12-05 DIAGNOSIS — M67.911 TENDINOPATHY OF ROTATOR CUFF, RIGHT: Primary | ICD-10-CM

## 2022-12-05 DIAGNOSIS — G57.61 MORTON'S NEUROMA OF RIGHT FOOT: ICD-10-CM

## 2022-12-05 PROCEDURE — 99214 OFFICE O/P EST MOD 30 MIN: CPT | Performed by: NURSE PRACTITIONER

## 2022-12-05 PROCEDURE — 20610 DRAIN/INJ JOINT/BURSA W/O US: CPT | Performed by: NURSE PRACTITIONER

## 2022-12-05 RX ORDER — LIDOCAINE HYDROCHLORIDE 10 MG/ML
4 INJECTION, SOLUTION EPIDURAL; INFILTRATION; INTRACAUDAL; PERINEURAL
Status: COMPLETED | OUTPATIENT
Start: 2022-12-05 | End: 2022-12-05

## 2022-12-05 RX ORDER — TRIAMCINOLONE ACETONIDE 40 MG/ML
80 INJECTION, SUSPENSION INTRA-ARTICULAR; INTRAMUSCULAR
Status: COMPLETED | OUTPATIENT
Start: 2022-12-05 | End: 2022-12-05

## 2022-12-05 RX ADMIN — TRIAMCINOLONE ACETONIDE 80 MG: 40 INJECTION, SUSPENSION INTRA-ARTICULAR; INTRAMUSCULAR at 10:29

## 2022-12-05 RX ADMIN — LIDOCAINE HYDROCHLORIDE 4 ML: 10 INJECTION, SOLUTION EPIDURAL; INFILTRATION; INTRACAUDAL; PERINEURAL at 10:29

## 2022-12-05 NOTE — PROGRESS NOTES
Subjective:     Patient ID: Virginia Wood is a 67 y.o. female.    Chief Complaint:  Right shoulder rotator cuff tendinopathy, subacromial bursitis  Left shoulder rotator cuff tendinopathy, subacromial bursitis  Corticosteroid injection bilateral shoulders 4/18/2022  History of Present Illness  Virginia Wood presents to clinic for evaluation of her right shoulder.  Received corticosteroid injection with 100% symptom relief initially and the injection has worn off over the last few months.  She is experiencing discomfort with reaching back behind her back and with sleeping, cranking down abdominally.  She does note symptom relief with rest and with the massage.  Rates discomfort at worst a 6-7 out of a 10 aching throbbing sharp shooting in nature.  Received 100% symptom relief immediately after the injection and then pain has slowly returned.  Denies she is experiencing any discomfort with driving.  She is right-hand dominant.  Has not experiencing any significant discomfort in her left shoulder.  Denies any presence of numbness or tingling bilateral upper extremities.  Denies any other concerns present.        Social History     Occupational History   • Not on file   Tobacco Use   • Smoking status: Never   • Smokeless tobacco: Never   Vaping Use   • Vaping Use: Never used   Substance and Sexual Activity   • Alcohol use: Yes     Comment: OCC   • Drug use: No   • Sexual activity: Defer      Past Medical History:   Diagnosis Date   • Asthma    • Congenital abnormality of kidney    • GERD (gastroesophageal reflux disease)    • Sinusitis    • Uterine polyp    • UTI (urinary tract infection)     on augmentin     Past Surgical History:   Procedure Laterality Date   • APPENDECTOMY     • BLADDER SURGERY     • COLONOSCOPY W/ POLYPECTOMY N/A 1/14/2022    Procedure: COLONOSCOPY WITH POLYPECTOMY;  Surgeon: Chester Patten MD;  Location: [facility];  Service: Gastroenterology;  Laterality: N/A;  Cecal polyp x 1; Sigmoid  "polyp x 1   • D & C HYSTEROSCOPY N/A 2/21/2018    Procedure: DILATATION AND CURETTAGE HYSTEROSCOPY WITH POLYPECTOMY AND MYOSURE;  Surgeon: Aleja Maciel MD;  Location: [facility];  Service:    • DILATATION AND CURETTAGE     • LUMBAR DISCECTOMY     • NEPHRECTOMY PARTIAL Left    • TUBAL ABDOMINAL LIGATION         Family History   Problem Relation Age of Onset   • Malig Hyperthermia Neg Hx    • Breast cancer Neg Hx                Objective:  Physical Exam    General: No acute distress.  Eyes: conjunctiva clear; pupils equally round and reactive  ENT: external ears and nose atraumatic; oropharynx clear  CV: no peripheral edema  Resp: normal respiratory effort  Skin: no rashes or wounds; normal turgor  Psych: mood and affect appropriate; recent and remote memory intact    Vitals:    12/05/22 1013   Weight: 98 kg (216 lb)   Height: 165.1 cm (65\")         12/05/22  1013   Weight: 98 kg (216 lb)     Body mass index is 35.94 kg/m².      Right Shoulder Exam     Tenderness   The patient is experiencing tenderness in the acromion.    Range of Motion   External rotation: 80   Forward flexion: 180   Internal rotation 0 degrees: L4     Tests   Paiz test: positive  Cross arm: negative  Impingement: positive  Drop arm: negative    Other   Erythema: absent  Sensation: normal  Pulse: present    Comments:  Internal rotation strength 4+ out of 5  External rotation strength 4+ out of 5  Supraspinatus strength 4+ out of 5  Subscapularis strength belly press exam 4+ out of 5  Bicep strength 4+/5  negative empty can  negative [place]'s  negative Speed's  negative bear hug exam        Left Shoulder Exam     Range of Motion   External rotation: 90              Right foot examined:  Positive sensation dorsum and plantar aspect of the foot including second digit  Positive tenderness to palpate plantar aspect second digit, distal right foot  2+ dorsalis pedis pulse  Assessment:        1. Thomas's neuroma of right foot    2. Tendinopathy " of rotator cuff, right           Plan:  Large Joint Arthrocentesis: R subacromial bursa  Date/Time: 12/5/2022 10:29 AM  Consent given by: patient  Site marked: site marked  Timeout: Immediately prior to procedure a time out was called to verify the correct patient, procedure, equipment, support staff and site/side marked as required   Supporting Documentation  Indications: pain   Procedure Details  Location: shoulder - R subacromial bursa  Preparation: Patient was prepped and draped in the usual sterile fashion  Needle size: 22 G  Approach: lateral  Medications administered: 80 mg triamcinolone acetonide 40 MG/ML; 4 mL lidocaine PF 1% 1 %  Patient tolerance: patient tolerated the procedure well with no immediate complications          1. Discussed plan of care with patient.  Discussed treatment options including repeat corticosteroid injection versus MRI.  At this time she does not wish to proceed with MRI, does not wish to proceed with surgical intervention including shoulder arthroscopy at this time.  She does wish to proceed with corticosteroid injection right shoulder.  Discussed application of ice at injection site this evening.    2. She does wish to proceed with referral to podiatry for tingling and numbness that she is experiencing in her second digit.  Plan to see her back in clinic in 3 months as needed.  All questions answered.  Orders:  Orders Placed This Encounter   Procedures   • Large Joint Arthrocentesis: R subacromial bursa   • Ambulatory Referral to Podiatry     No orders of the defined types were placed in this encounter.        Dragon Dictation utilized.

## 2023-04-24 ENCOUNTER — APPOINTMENT (OUTPATIENT)
Dept: CT IMAGING | Facility: HOSPITAL | Age: 68
End: 2023-04-24
Payer: MEDICARE

## 2023-04-24 ENCOUNTER — HOSPITAL ENCOUNTER (EMERGENCY)
Facility: HOSPITAL | Age: 68
Discharge: HOME | End: 2023-04-24
Attending: EMERGENCY MEDICINE | Admitting: EMERGENCY MEDICINE
Payer: MEDICARE

## 2023-04-24 VITALS
WEIGHT: 216 LBS | OXYGEN SATURATION: 97 % | TEMPERATURE: 97.6 F | HEART RATE: 69 BPM | SYSTOLIC BLOOD PRESSURE: 147 MMHG | RESPIRATION RATE: 18 BRPM | HEIGHT: 65 IN | DIASTOLIC BLOOD PRESSURE: 72 MMHG | BODY MASS INDEX: 35.99 KG/M2

## 2023-04-24 DIAGNOSIS — R51.9 ACUTE NONINTRACTABLE HEADACHE, UNSPECIFIED HEADACHE TYPE: ICD-10-CM

## 2023-04-24 DIAGNOSIS — M54.2 MUSCULOSKELETAL NECK PAIN: Primary | ICD-10-CM

## 2023-04-24 PROCEDURE — 25010000002 PROCHLORPERAZINE 10 MG/2ML SOLUTION

## 2023-04-24 PROCEDURE — 72125 CT NECK SPINE W/O DYE: CPT

## 2023-04-24 PROCEDURE — 96374 THER/PROPH/DIAG INJ IV PUSH: CPT

## 2023-04-24 PROCEDURE — 70450 CT HEAD/BRAIN W/O DYE: CPT

## 2023-04-24 PROCEDURE — 96375 TX/PRO/DX INJ NEW DRUG ADDON: CPT

## 2023-04-24 PROCEDURE — 25010000002 DIPHENHYDRAMINE PER 50 MG

## 2023-04-24 PROCEDURE — 99283 EMERGENCY DEPT VISIT LOW MDM: CPT

## 2023-04-24 RX ORDER — ACETAMINOPHEN 500 MG
500 TABLET ORAL ONCE
Status: COMPLETED | OUTPATIENT
Start: 2023-04-24 | End: 2023-04-24

## 2023-04-24 RX ORDER — ACETAMINOPHEN 500 MG
500 TABLET ORAL EVERY 6 HOURS PRN
Qty: 60 TABLET | Refills: 0 | Status: SHIPPED | OUTPATIENT
Start: 2023-04-24

## 2023-04-24 RX ORDER — DIPHENHYDRAMINE HYDROCHLORIDE 50 MG/ML
25 INJECTION INTRAMUSCULAR; INTRAVENOUS ONCE
Status: COMPLETED | OUTPATIENT
Start: 2023-04-24 | End: 2023-04-24

## 2023-04-24 RX ORDER — SODIUM CHLORIDE 0.9 % (FLUSH) 0.9 %
10 SYRINGE (ML) INJECTION AS NEEDED
Status: DISCONTINUED | OUTPATIENT
Start: 2023-04-24 | End: 2023-04-24 | Stop reason: HOSPADM

## 2023-04-24 RX ORDER — PROCHLORPERAZINE EDISYLATE 5 MG/ML
10 INJECTION INTRAMUSCULAR; INTRAVENOUS EVERY 6 HOURS PRN
Status: DISCONTINUED | OUTPATIENT
Start: 2023-04-24 | End: 2023-04-24 | Stop reason: HOSPADM

## 2023-04-24 RX ORDER — METHOCARBAMOL 750 MG/1
750 TABLET, FILM COATED ORAL 3 TIMES DAILY PRN
Qty: 30 TABLET | Refills: 0 | Status: SHIPPED | OUTPATIENT
Start: 2023-04-24

## 2023-04-24 RX ADMIN — SODIUM CHLORIDE 1000 ML: 9 INJECTION, SOLUTION INTRAVENOUS at 13:10

## 2023-04-24 RX ADMIN — PROCHLORPERAZINE EDISYLATE 10 MG: 5 INJECTION, SOLUTION INTRAMUSCULAR; INTRAVENOUS at 13:15

## 2023-04-24 RX ADMIN — ACETAMINOPHEN 500 MG: 500 TABLET ORAL at 13:04

## 2023-04-24 RX ADMIN — DIPHENHYDRAMINE HYDROCHLORIDE 25 MG: 50 INJECTION, SOLUTION INTRAMUSCULAR; INTRAVENOUS at 13:09

## 2023-04-24 NOTE — ED PROVIDER NOTES
EMERGENCY DEPARTMENT ENCOUNTER      Room Number: 08/08    History is provided by the patient, no translation services needed    HPI:    Chief complaint: Headache    Location: Generalized head    Quality/Severity: Patient describes the pain as a dull pain that she rates a 7 out of 10.    Timing/Duration: 6 days    Modifying Factors: Tylenol minimally improves her pain.  A heating pad minimally improves her pain.    Associated Symptoms: Neck pain    Narrative: Pt is a 67 y.o. female who presents complaining of a headache and neck pain x6 days.  Patient advises that she is a  and she was sleeping in her truck when she woke up 6 days ago.  She states that she noticed a dull aching pain on the left posterior aspect of her neck that has since radiated up into her head causing a headache.  She describes the pain as a dull pain that she rates a 7 out of 10.  The patient has tried taking Tylenol and has had minimal pain relief from the Tylenol.  She also states that using a heating pad helps her to fall asleep by slightly decreasing the pain.  She denies any dizziness or vision changes.  She denies any fevers or chills.  Patient denies chest pain, shortness of breath, abdominal pain, nausea, vomiting, or diarrhea.  She denies any weakness.      PMD: Mary Ann Rasmussen MD    REVIEW OF SYSTEMS  Review of Systems   Constitutional: Negative for chills and fever.   Eyes: Negative for photophobia and visual disturbance.   Respiratory: Negative for cough and shortness of breath.    Cardiovascular: Negative for chest pain and leg swelling.   Gastrointestinal: Negative for abdominal pain, constipation, diarrhea, nausea and vomiting.   Genitourinary: Negative for dysuria and flank pain.   Musculoskeletal: Positive for neck pain. Negative for back pain and gait problem.   Skin: Negative for color change, pallor, rash and wound.   Neurological: Positive for headaches. Negative for dizziness, seizures, syncope,  facial asymmetry, speech difficulty, weakness, light-headedness and numbness.   Psychiatric/Behavioral: Negative for confusion. The patient is not nervous/anxious.          PAST MEDICAL HISTORY  Active Ambulatory Problems     Diagnosis Date Noted   • AC joint arthropathy 03/03/2021   • Screen for colon cancer 09/17/2021   • Tendinopathy of rotator cuff, left 04/18/2022   • Subacromial bursitis of right shoulder joint 04/18/2022   • Subacromial bursitis of left shoulder joint 04/18/2022     Resolved Ambulatory Problems     Diagnosis Date Noted   • No Resolved Ambulatory Problems     Past Medical History:   Diagnosis Date   • Asthma    • Congenital abnormality of kidney    • GERD (gastroesophageal reflux disease)    • Sinusitis    • Uterine polyp    • UTI (urinary tract infection)        PAST SURGICAL HISTORY  Past Surgical History:   Procedure Laterality Date   • APPENDECTOMY     • BLADDER SURGERY     • COLONOSCOPY W/ POLYPECTOMY N/A 1/14/2022    Procedure: COLONOSCOPY WITH POLYPECTOMY;  Surgeon: Chester Patten MD;  Location: [facility] OR;  Service: Gastroenterology;  Laterality: N/A;  Cecal polyp x 1; Sigmoid polyp x 1   • D & C HYSTEROSCOPY N/A 2/21/2018    Procedure: DILATATION AND CURETTAGE HYSTEROSCOPY WITH POLYPECTOMY AND MYOSURE;  Surgeon: Aleja Maciel MD;  Location: [facility] OR [facility] [place];  Service:    • DILATATION AND CURETTAGE     • LUMBAR DISCECTOMY     • NEPHRECTOMY PARTIAL Left    • TUBAL ABDOMINAL LIGATION         FAMILY HISTORY  Family History   Problem Relation Age of Onset   • Malig Hyperthermia Neg Hx    • Breast cancer Neg Hx        SOCIAL HISTORY  Social History     Socioeconomic History   • Marital status:    Tobacco Use   • Smoking status: Never   • Smokeless tobacco: Never   Vaping Use   • Vaping Use: Never used   Substance and Sexual Activity   • Alcohol use: Yes     Comment: OCC   • Drug use: No   • Sexual activity: Defer       ALLERGIES  Nickel      Current Facility-Administered  Medications:   •  prochlorperazine (COMPAZINE) injection 10 mg, 10 mg, Intravenous, Q6H PRN, Holly Jacob PA-C, 10 mg at 04/24/23 1315  •  Insert Peripheral IV, , , Once **AND** sodium chloride 0.9 % flush 10 mL, 10 mL, Intravenous, PRN, Holly Jacob PA-C    Current Outpatient Medications:   •  FLUoxetine (PROzac) 20 MG capsule, , Disp: , Rfl:   •  omeprazole (priLOSEC) 20 MG capsule, Take 1 capsule by mouth Daily., Disp: , Rfl:   •  pravastatin (PRAVACHOL) 40 MG tablet, , Disp: , Rfl:   •  spironolactone-hydrochlorothiazide (ALDACTAZIDE) 25-25 MG tablet, TAKE 1 TABLET IN THE MORNING FOR SWELLING, Disp: , Rfl:   •  acetaminophen (TYLENOL) 500 MG tablet, Take 1 tablet by mouth Every 6 (Six) Hours As Needed for Mild Pain., Disp: 60 tablet, Rfl: 0  •  albuterol sulfate  (90 Base) MCG/ACT inhaler, Inhale 2 puffs Every 4 (Four) Hours As Needed for Wheezing., Disp: 6.7 g, Rfl: 0  •  amoxicillin-clavulanate (Augmentin) 875-125 MG per tablet, Take 1 tablet by mouth Every 12 (Twelve) Hours., Disp: 20 tablet, Rfl: 0  •  benzonatate (TESSALON) 200 MG capsule, Take 1 capsule by mouth 3 (Three) Times a Day As Needed for Cough., Disp: 30 capsule, Rfl: 0  •  chlorhexidine (PERIDEX) 0.12 % solution, , Disp: , Rfl:   •  methocarbamol (ROBAXIN) 750 MG tablet, Take 1 tablet by mouth 3 (Three) Times a Day As Needed for Muscle Spasms., Disp: 30 tablet, Rfl: 0  •  miSOPROStol (CYTOTEC) 200 MCG tablet, TAKE 2 TABLETS VAGINALLY AT BEDTIME THE NIGHT BEFORE PROCEDURE, Disp: , Rfl:   •  promethazine-dextromethorphan (PROMETHAZINE-DM) 6.25-15 MG/5ML syrup, Take 5 mL by mouth At Night As Needed for Cough., Disp: 90 mL, Rfl: 0    PHYSICAL EXAM  ED Triage Vitals [04/24/23 1154]   Temp Heart Rate Resp BP SpO2   97.6 °F (36.4 °C) 108 18 177/91 96 %      Temp src Heart Rate Source Patient Position BP Location FiO2 (%)   Oral Monitor Lying Left arm --       Physical Exam  Vitals and nursing note reviewed.   Constitutional:       General:  She is not in acute distress.     Appearance: Normal appearance. She is not ill-appearing, toxic-appearing or diaphoretic.   HENT:      Head: Normocephalic and atraumatic.      Nose: Nose normal.      Mouth/Throat:      Mouth: Mucous membranes are moist.      Pharynx: Oropharynx is clear.   Eyes:      General: No scleral icterus.        Right eye: No discharge.         Left eye: No discharge.      Extraocular Movements: Extraocular movements intact.      Conjunctiva/sclera: Conjunctivae normal.      Pupils: Pupils are equal, round, and reactive to light.   Cardiovascular:      Rate and Rhythm: Normal rate and regular rhythm.      Heart sounds: Normal heart sounds.     No friction rub.   Pulmonary:      Effort: Pulmonary effort is normal. No respiratory distress.      Breath sounds: Normal breath sounds. No stridor. No wheezing, rhonchi or rales.   Chest:      Chest wall: No tenderness.   Abdominal:      General: Bowel sounds are normal. There is no distension.      Palpations: Abdomen is soft. There is no mass.      Tenderness: There is no abdominal tenderness. There is no guarding or rebound.   Musculoskeletal:         General: No swelling, tenderness, deformity or signs of injury. Normal range of motion.      Cervical back: Normal range of motion and neck supple. No rigidity.      Right lower leg: No edema.      Left lower leg: No edema.   Skin:     General: Skin is warm and dry.      Coloration: Skin is not jaundiced or pale.      Findings: No bruising, erythema, lesion or rash.   Neurological:      Mental Status: She is alert and oriented to person, place, and time.      Motor: No weakness.      Coordination: Coordination normal.   Psychiatric:         Mood and Affect: Mood and affect normal.           LAB RESULTS  Lab Results (last 24 hours)     ** No results found for the last 24 hours. **            RADIOLOGY  CT Head Without Contrast    Result Date: 4/24/2023  CT Head WO, CT Spine Cervical WO HISTORY:  Headache for 6 days, neck pain for 6 days, no known injury TECHNIQUE: Routine noncontrast head CT and cervical spine CT. Radiation dose reduction techniques included automated exposure control or exposure modulation based on body size. Radiation audit for CT and nuclear cardiology exams in the last 12 months: 0. COMPARISON: None. FINDINGS: HEAD: No acute intracranial hemorrhage, mass lesion, or abnormal extra-axial fluid collection. No midline shift or focal mass effect. Ventricular system is normal in size and configuration. . The gray-white matter differentiation is preserved. Visualized paranasal sinuses are clear. Small amount of fluid in the left mastoid air cells. No acute osseous abnormality. CERVICAL SPINE: Nonspecific straightening of the cervical spine. Slight anterior listhesis of C6 on C7. No destructive bony lesion. No acute displaced fracture or dislocation. Prevertebral soft tissues within normal limits. Degenerative changes in the cervical spine. Most notably there is ossification of the posterior longitudinal ligament and posterior disc osteophyte complex spanning C3-C4 through C5-C6 with probably moderate spinal canal narrowing at each of these levels. Multilevel severe foraminal narrowing.     1.  No acute intracranial abnormality. 2.  No acute bony abnormality in the cervical spine. Often severe degenerative changes Signer Name: SARAH AGUILERA MD  Signed: 4/24/2023 12:47 PM  Workstation Name: [email or web address]  [facility]    CT Cervical Spine Without Contrast    Result Date: 4/24/2023  CT Head WO, CT Spine Cervical WO HISTORY: Headache for 6 days, neck pain for 6 days, no known injury TECHNIQUE: Routine noncontrast head CT and cervical spine CT. Radiation dose reduction techniques included automated exposure control or exposure modulation based on body size. Radiation audit for CT and nuclear cardiology exams in the last 12 months: 0. COMPARISON: None. FINDINGS: HEAD: No acute  intracranial hemorrhage, mass lesion, or abnormal extra-axial fluid collection. No midline shift or focal mass effect. Ventricular system is normal in size and configuration. . The gray-white matter differentiation is preserved. Visualized paranasal sinuses are clear. Small amount of fluid in the left mastoid air cells. No acute osseous abnormality. CERVICAL SPINE: Nonspecific straightening of the cervical spine. Slight anterior listhesis of C6 on C7. No destructive bony lesion. No acute displaced fracture or dislocation. Prevertebral soft tissues within normal limits. Degenerative changes in the cervical spine. Most notably there is ossification of the posterior longitudinal ligament and posterior disc osteophyte complex spanning C3-C4 through C5-C6 with probably moderate spinal canal narrowing at each of these levels. Multilevel severe foraminal narrowing.     1.  No acute intracranial abnormality. 2.  No acute bony abnormality in the cervical spine. Often severe degenerative changes Signer Name: SARAH AGUILERA MD  Signed: 4/24/2023 12:47 PM  Workstation Name: [facility]  [facility] [place]      I ordered the above radiologic testing and reviewed the results    PROCEDURES  Procedures      PROGRESS AND CONSULTS  ED Course as of 04/24/23 1353   Mon Apr 24, 2023   1217 Patient appears well.  Her heart rate is 97 on exam.  All other vital signs are stable and within normal limits.  Her neuro exam is unremarkable.  Kernig and Brudzinski signs are negative.  We will order a CT of the head and neck to further evaluate.  If those are within normal limits, I will order a migraine cocktail as well. [AH]   1252 Results discussed with the patient.  She expressed understanding.  I advised that I would discharge her with a muscle relaxer as her pain appears to be musculoskeletal in nature.  She is in agreement.  I advised the patient not to work while taking a muscle relaxer as it can make her drowsy.  She  expressed understanding.  Follow-up instructions given.  Return to the ED instructions given. [AH]   1353 Patient states that she is feeling better at this time.  She is comfortable being discharged. []      ED Course User Index  [] Holly Jacob PA-C           MEDICAL DECISION MAKING    MDM     My differential diagnosis for headache includes but is not limited to:  Migraine, cluster, ischemic stroke, subarachnoid hemorrhage, intracranial hemorrhage, vascular malformation, cerebral aneurysm, vascular dissection, vasculitis, temporal arteritis, malignant hypertension, pheochromocytoma, cerebral venous thrombosis, preeclampsia; bacterial meningitis, viral meningitis, fungal meningitis, encephalitis, brain abscess, pleural empyema, sinusitis, dental infection, influenza, viral syndrome; carbon monoxide exposure, analgesic abuse, hypoglycemia; trigeminal neuralgia, postherpetic neuralgia, occipital neuralgia; subdural hematoma, concussion, musculoskeletal tension, cervical osteoarthritis; glaucoma, TMJ disease, pseudotumor cerebri, post LP headache, intracranial neoplasm, sleep apnea  DIAGNOSIS  Final diagnoses:   Musculoskeletal neck pain   Acute nonintractable headache, unspecified headache type       Latest Documented Vital Signs:  As of 13:53 EDT  BP- 162/78 HR- 70 Temp- 97.6 °F (36.4 °C) (Oral) O2 sat- 95%    DISPOSITION  Pt discharged    Discussed pertinent findings with the patient/family.  Patient/Family voiced understanding of need to follow-up for recheck and further testing as needed.  Return to the Emergency Department warnings were given.         Medication List      New Prescriptions    acetaminophen 500 MG tablet  Commonly known as: TYLENOL  Take 1 tablet by mouth Every 6 (Six) Hours As Needed for Mild Pain.     methocarbamol 750 MG tablet  Commonly known as: ROBAXIN  Take 1 tablet by mouth 3 (Three) Times a Day As Needed for Muscle Spasms.           Where to Get Your Medications      These  medications were sent to [facility] - [place], [place] - [address] - [phone]  - [phone] FX  [address] [facility] [address] [address]    Phone: [phone]   · acetaminophen 500 MG tablet  · methocarbamol 750 MG tablet              Follow-up Information     Mary Ann Rasmussen MD. Call today.    Specialty: Family Medicine  Why: to schedule follow up  Contact information:  [address] [place] [address] [id number] [phone]             Go to  [facility] Emergency Department.    Specialty: Emergency Medicine  Why: If symptoms worsen  Contact information:  [address] [facility] [address] [phone]           Eitan Torres MD. Call today.    Specialty: Orthopedic Surgery  Why: to schedule follow up  Contact information:  [address] [facility] [address] [phone]                           Dictated utilizing Dragon dictation     Holly Jacob PA-C  04/24/23 2717

## 2023-07-25 ENCOUNTER — TRANSCRIBE ORDERS (OUTPATIENT)
Dept: MAMMOGRAPHY | Facility: HOSPITAL | Age: 68
End: 2023-07-25
Payer: MEDICARE

## 2023-07-25 DIAGNOSIS — Z12.31 SCREENING MAMMOGRAM FOR BREAST CANCER: Primary | ICD-10-CM

## 2023-08-01 ENCOUNTER — HOSPITAL ENCOUNTER (OUTPATIENT)
Dept: GENERAL RADIOLOGY | Facility: HOSPITAL | Age: 68
Discharge: HOME | End: 2023-08-01
Admitting: FAMILY MEDICINE
Payer: MEDICARE

## 2023-08-01 ENCOUNTER — TRANSCRIBE ORDERS (OUTPATIENT)
Dept: ADMINISTRATIVE | Facility: HOSPITAL | Age: 68
End: 2023-08-01
Payer: MEDICARE

## 2023-08-01 DIAGNOSIS — M79.672 LEFT FOOT PAIN: Primary | ICD-10-CM

## 2023-08-01 DIAGNOSIS — M79.672 LEFT FOOT PAIN: ICD-10-CM

## 2023-08-01 PROCEDURE — 73630 X-RAY EXAM OF FOOT: CPT

## 2023-08-14 ENCOUNTER — HOSPITAL ENCOUNTER (OUTPATIENT)
Dept: MAMMOGRAPHY | Facility: HOSPITAL | Age: 68
Discharge: HOME | End: 2023-08-14
Admitting: FAMILY MEDICINE
Payer: MEDICARE

## 2023-08-14 DIAGNOSIS — Z12.31 SCREENING MAMMOGRAM FOR BREAST CANCER: ICD-10-CM

## 2023-08-14 PROCEDURE — 77067 SCR MAMMO BI INCL CAD: CPT

## 2023-08-14 PROCEDURE — 77063 BREAST TOMOSYNTHESIS BI: CPT

## 2023-08-21 ENCOUNTER — OFFICE VISIT (OUTPATIENT)
Dept: ORTHOPEDIC SURGERY | Facility: CLINIC | Age: 68
End: 2023-08-21
Payer: MEDICARE

## 2023-08-21 VITALS — WEIGHT: 216 LBS | HEIGHT: 65 IN | BODY MASS INDEX: 35.99 KG/M2

## 2023-08-21 DIAGNOSIS — M70.61 GREATER TROCHANTERIC BURSITIS OF RIGHT HIP: ICD-10-CM

## 2023-08-21 DIAGNOSIS — M67.911 TENDINOPATHY OF ROTATOR CUFF, RIGHT: ICD-10-CM

## 2023-08-21 DIAGNOSIS — M19.019 AC JOINT ARTHROPATHY: ICD-10-CM

## 2023-08-21 DIAGNOSIS — M25.551 RIGHT HIP PAIN: ICD-10-CM

## 2023-08-21 DIAGNOSIS — M75.51 SUBACROMIAL BURSITIS OF RIGHT SHOULDER JOINT: Primary | ICD-10-CM

## 2023-08-21 DIAGNOSIS — M67.912 TENDINOPATHY OF ROTATOR CUFF, LEFT: ICD-10-CM

## 2023-08-21 DIAGNOSIS — M75.52 SUBACROMIAL BURSITIS OF LEFT SHOULDER JOINT: ICD-10-CM

## 2023-08-21 PROCEDURE — 73030 X-RAY EXAM OF SHOULDER: CPT | Performed by: NURSE PRACTITIONER

## 2023-08-21 PROCEDURE — 20610 DRAIN/INJ JOINT/BURSA W/O US: CPT | Performed by: NURSE PRACTITIONER

## 2023-08-21 PROCEDURE — 73502 X-RAY EXAM HIP UNI 2-3 VIEWS: CPT | Performed by: NURSE PRACTITIONER

## 2023-08-21 PROCEDURE — 99214 OFFICE O/P EST MOD 30 MIN: CPT | Performed by: NURSE PRACTITIONER

## 2023-08-21 RX ORDER — TRIAMCINOLONE ACETONIDE 40 MG/ML
80 INJECTION, SUSPENSION INTRA-ARTICULAR; INTRAMUSCULAR
Status: COMPLETED | OUTPATIENT
Start: 2023-08-21 | End: 2023-08-21

## 2023-08-21 RX ORDER — LIDOCAINE HYDROCHLORIDE 10 MG/ML
4 INJECTION, SOLUTION EPIDURAL; INFILTRATION; INTRACAUDAL; PERINEURAL
Status: COMPLETED | OUTPATIENT
Start: 2023-08-21 | End: 2023-08-21

## 2023-08-21 RX ORDER — OMEPRAZOLE 20 MG/1
20 CAPSULE, DELAYED RELEASE ORAL DAILY
COMMUNITY
Start: 2023-07-13

## 2023-08-21 RX ADMIN — TRIAMCINOLONE ACETONIDE 80 MG: 40 INJECTION, SUSPENSION INTRA-ARTICULAR; INTRAMUSCULAR at 09:23

## 2023-08-21 RX ADMIN — LIDOCAINE HYDROCHLORIDE 4 ML: 10 INJECTION, SOLUTION EPIDURAL; INFILTRATION; INTRACAUDAL; PERINEURAL at 09:23

## 2023-08-21 NOTE — PROGRESS NOTES
Subjective:     Patient ID: Virginia Wood is a 68 y.o. female.    Chief Complaint:  Right shoulder rotator cuff tendinopathy, subacromial bursitis   Corticosteroid injection right shoulder  Right hip pain, new issue to examiner  History of Present Illness  Virginia Wood Returns to clinic for follow-up of her right shoulder.  Maximal tenderness present along the lateral aspect of the shoulder, pain present on a daily basis, pain noted at night.  Increased pain noted reaching overhead, reaching out to side with activities of daily living.  Rates discomfort 7-8 out of a 10 aching throbbing in nature.  Denies any presence of numbness or tingling radiating down the right upper extremity she is experiencing some minimal discomfort of the left upper extremity but nothing really to report.  She has received corticosteroid injections in the past well with the injections.  She is also experiencing pain that is present along the right hip.  Maximal tenderness present along the lateral aspect of the hip increased pain noted with ambulating long distances, with transitional activity such from seated standing attempting to walk and when sleeping.  Mild symptom relief with rest.  Pain is not radiating to the groin.  Denies any other concerns present.         Social History     Occupational History    Not on file   Tobacco Use    Smoking status: Never    Smokeless tobacco: Never   Vaping Use    Vaping Use: Never used   Substance and Sexual Activity    Alcohol use: Yes     Comment: Very seldom    Drug use: No    Sexual activity: Yes     Partners: Male     Birth control/protection: Post-menopausal      Past Medical History:   Diagnosis Date    Asthma     Congenital abnormality of kidney     Fracture of wrist 1969    GERD (gastroesophageal reflux disease)     Greater trochanteric bursitis of right hip 8/21/2023    Knee swelling 2019    Lumbosacral disc disease 1993    Periarthritis of shoulder 2020    Rotator cuff syndrome 2020     "Sinusitis     Uterine polyp     UTI (urinary tract infection)     on augmentin     Past Surgical History:   Procedure Laterality Date    APPENDECTOMY      BACK SURGERY  1993    BLADDER SURGERY      COLONOSCOPY W/ POLYPECTOMY N/A 01/14/2022    Procedure: COLONOSCOPY WITH POLYPECTOMY;  Surgeon: Chester Patten MD;  Location: [facility] OR;  Service: Gastroenterology;  Laterality: N/A;  Cecal polyp x 1; Sigmoid polyp x 1    D & C HYSTEROSCOPY N/A 02/21/2018    Procedure: DILATATION AND CURETTAGE HYSTEROSCOPY WITH POLYPECTOMY AND MYOSURE;  Surgeon: Aleja Maciel MD;  Location:  [facility] [place];  Service:     DILATATION AND CURETTAGE      LUMBAR DISCECTOMY      NEPHRECTOMY PARTIAL Left     TUBAL ABDOMINAL LIGATION         Family History   Problem Relation Age of Onset    Cancer Father     Osteoporosis Maternal Grandmother     Malig Hyperthermia Neg Hx     Breast cancer Neg Hx                Objective:  Physical Exam    General: No acute distress.  Eyes: conjunctiva clear; pupils equally round and reactive  ENT: external ears and nose atraumatic; oropharynx clear  CV: no peripheral edema  Resp: normal respiratory effort  Skin: no rashes or wounds; normal turgor  Psych: mood and affect appropriate; recent and remote memory intact    Vitals:    08/21/23 0828   Weight: 98 kg (216 lb)   Height: 165.1 cm (65\")         08/21/23  0828   Weight: 98 kg (216 lb)     Body mass index is 35.94 kg/mý.      Right Hip Exam     Tenderness   The patient is experiencing tenderness in the greater trochanter.    Range of Motion   Abduction:  45   Adduction:  30   Extension:  0   Flexion:  100   External rotation:  70   Internal rotation:  25     Muscle Strength   Abduction: 4/5   Adduction: 4/5   Flexion: 4/5     Tests   SUKHWINDER: negative  Fadir:  Negative FADIR test    Other   Erythema: absent  Sensation: normal    Comments:  Negative logroll exam  Negative Stinchfield exam           Right shoulder examined  Maximal tenderness present " lateral acromion  Forward lection 170 degrees external rotation 80 degrees internal rotation L3  Positive Paiz, positive impingement, negative drop arm, negative crossarm exam  Positive sensation light touch all distributions right upper extremity  Internal/external rotation strength 4+ out of 5  Subscapularis and belly press exam 4+ out of 5  Supraspinatus strength 4 out of 5  Bicep strength 4 out of 5  Mildly positive empty can  Negative Trenton's  Positive speeds  Negative bearhug sign    Imaging:  Bilateral Shoulder X-Ray  Indication: Pain  AP Internal and External Rotation views    Findings:  No fracture  No bony lesion  Normal soft tissues  Lateral downsloping bilateral shoulders, AC joint arthritis, Glenohumeral chondromalacia bilaterally  prior studies were available for comparison.    Right Hip X-Ray  Indication: Pain  AP and Frog Leg views    Findings:  No fracture  No bony lesion  Normal soft tissues  Mild hip arthrosis     No prior studies were available for comparison.    Assessment:        1. Subacromial bursitis of right shoulder joint    2. Tendinopathy of rotator cuff, right    3. Tendinopathy of rotator cuff, left    4. Subacromial bursitis of left shoulder joint    5. AC joint arthropathy    6. Right hip pain    7. Greater trochanteric bursitis of right hip           Plan:    Large Joint Arthrocentesis: R subacromial bursa  Date/Time: 8/21/2023 9:23 AM  Consent given by: patient  Site marked: site marked  Timeout: Immediately prior to procedure a time out was called to verify the correct patient, procedure, equipment, support staff and site/side marked as required   Supporting Documentation  Indications: pain   Procedure Details  Location: shoulder - R subacromial bursa  Preparation: Patient was prepped and draped in the usual sterile fashion  Needle size: 22 G  Approach: lateral  Medications administered: 4 mL lidocaine PF 1% 1 %; 80 mg triamcinolone acetonide 40 MG/ML  Patient tolerance:  patient tolerated the procedure well with no immediate complications    1.  Discussed plan of care with patient.  She does wish to proceed corticosteroid injection subacromial approach lateral aspect of the shoulder.  I do recommend application of ice to injection site this evening.  We also discussed treatment options including proceeding with corticosteroid injection right hip however wishes to hold off at this time.  I do recommend figure-of-four stretches, pulling the needed chest for stretches as well.  We will plan to see her back in clinic as needed.  All questions answered.    Orders:  Orders Placed This Encounter   Procedures    Large Joint Arthrocentesis: R subacromial bursa    XR Shoulder 2+ View Bilateral    XR Hip With or Without Pelvis 2 - 3 View Right     No orders of the defined types were placed in this encounter.            Dragon dictation utilized

## 2024-01-09 ENCOUNTER — TRANSCRIBE ORDERS (OUTPATIENT)
Dept: ADMINISTRATIVE | Facility: HOSPITAL | Age: 69
End: 2024-01-09
Payer: MEDICARE

## 2024-01-09 DIAGNOSIS — Z78.0 MENOPAUSE: Primary | ICD-10-CM

## 2024-01-22 ENCOUNTER — APPOINTMENT (OUTPATIENT)
Dept: BONE DENSITY | Facility: HOSPITAL | Age: 69
End: 2024-01-22
Payer: MEDICARE

## 2024-01-22 ENCOUNTER — TRANSCRIBE ORDERS (OUTPATIENT)
Dept: ULTRASOUND IMAGING | Facility: HOSPITAL | Age: 69
End: 2024-01-22
Payer: MEDICARE

## 2024-01-22 DIAGNOSIS — N18.31 STAGE 3A CHRONIC KIDNEY DISEASE: Primary | ICD-10-CM

## 2024-01-22 DIAGNOSIS — Z78.0 MENOPAUSE: ICD-10-CM

## 2024-01-22 PROCEDURE — 77080 DXA BONE DENSITY AXIAL: CPT

## 2024-02-12 ENCOUNTER — HOSPITAL ENCOUNTER (OUTPATIENT)
Dept: ULTRASOUND IMAGING | Facility: HOSPITAL | Age: 69
Discharge: HOME | End: 2024-02-12
Admitting: FAMILY MEDICINE
Payer: MEDICARE

## 2024-02-12 DIAGNOSIS — N18.31 STAGE 3A CHRONIC KIDNEY DISEASE: ICD-10-CM

## 2024-02-12 PROCEDURE — 76775 US EXAM ABDO BACK WALL LIM: CPT

## (undated) DEVICE — VIAL FORMALIN CAP 10P 40ML

## (undated) DEVICE — ADAPT CLN BIOGUARD AIR/H2O DISP

## (undated) DEVICE — OSC HYSTEROSCOPY: Brand: MEDLINE INDUSTRIES, INC.

## (undated) DEVICE — GLV SURG SENSICARE PI MIC PF SZ7.5 LF STRL

## (undated) DEVICE — SYR LL TP 10ML STRL

## (undated) DEVICE — Device

## (undated) DEVICE — JACKT LAB F/R KNIT CUFF/COLR XLG BLU

## (undated) DEVICE — NDL SPINE 22G 31/2IN BLK

## (undated) DEVICE — SAFELINER SUCTION CANISTER 1000CC: Brand: DEROYAL

## (undated) DEVICE — KT ORCA ORCAPOD DISP STRL

## (undated) DEVICE — SPNG GZ WOVN 4X4IN 12PLY 10/BX STRL

## (undated) DEVICE — FRCP BX RADJAW4 NDL 2.8 240CM LG OG BX40

## (undated) DEVICE — BW-412T DISP COMBO CLEANING BRUSH: Brand: SINGLE USE COMBINATION CLEANING BRUSH

## (undated) DEVICE — CANSTR SPECI FLUID COL BEMIS

## (undated) DEVICE — SEAL HYSTERSCOPE/OUTFLOW CHANNEL MYOSURE

## (undated) DEVICE — SPECIMEN ADAPTOR: Brand: BEMIS

## (undated) DEVICE — GLV SURG SENSICARE MICRO PF LF 6.5 STRL